# Patient Record
Sex: FEMALE | Race: WHITE | Employment: FULL TIME | URBAN - METROPOLITAN AREA
[De-identification: names, ages, dates, MRNs, and addresses within clinical notes are randomized per-mention and may not be internally consistent; named-entity substitution may affect disease eponyms.]

---

## 2021-11-03 ENCOUNTER — HOSPITAL ENCOUNTER (EMERGENCY)
Age: 46
Discharge: LEFT BEFORE DISCHARGE | End: 2021-11-03

## 2021-11-03 VITALS — TEMPERATURE: 97.3 F

## 2022-07-26 SDOH — HEALTH STABILITY: PHYSICAL HEALTH: ON AVERAGE, HOW MANY DAYS PER WEEK DO YOU ENGAGE IN MODERATE TO STRENUOUS EXERCISE (LIKE A BRISK WALK)?: 3 DAYS

## 2022-07-26 SDOH — HEALTH STABILITY: PHYSICAL HEALTH: ON AVERAGE, HOW MANY MINUTES DO YOU ENGAGE IN EXERCISE AT THIS LEVEL?: 100 MIN

## 2022-07-26 ASSESSMENT — SOCIAL DETERMINANTS OF HEALTH (SDOH)
WITHIN THE LAST YEAR, HAVE YOU BEEN KICKED, HIT, SLAPPED, OR OTHERWISE PHYSICALLY HURT BY YOUR PARTNER OR EX-PARTNER?: NO
WITHIN THE LAST YEAR, HAVE YOU BEEN AFRAID OF YOUR PARTNER OR EX-PARTNER?: NO
WITHIN THE LAST YEAR, HAVE YOU BEEN HUMILIATED OR EMOTIONALLY ABUSED IN OTHER WAYS BY YOUR PARTNER OR EX-PARTNER?: NO
WITHIN THE LAST YEAR, HAVE TO BEEN RAPED OR FORCED TO HAVE ANY KIND OF SEXUAL ACTIVITY BY YOUR PARTNER OR EX-PARTNER?: NO

## 2022-07-28 ENCOUNTER — HOSPITAL ENCOUNTER (OUTPATIENT)
Age: 47
Discharge: HOME | End: 2022-07-30
Payer: COMMERCIAL

## 2022-07-28 ENCOUNTER — HOSPITAL ENCOUNTER (OUTPATIENT)
Dept: GENERAL RADIOLOGY | Age: 47
Discharge: HOME | End: 2022-07-30
Payer: COMMERCIAL

## 2022-07-28 ENCOUNTER — OFFICE VISIT (OUTPATIENT)
Dept: FAMILY MEDICINE CLINIC | Age: 47
End: 2022-07-28
Payer: COMMERCIAL

## 2022-07-28 VITALS
RESPIRATION RATE: 16 BRPM | HEART RATE: 94 BPM | OXYGEN SATURATION: 97 % | SYSTOLIC BLOOD PRESSURE: 136 MMHG | BODY MASS INDEX: 41.3 KG/M2 | WEIGHT: 257 LBS | DIASTOLIC BLOOD PRESSURE: 84 MMHG | HEIGHT: 66 IN | TEMPERATURE: 98.2 F

## 2022-07-28 DIAGNOSIS — Z13.1 DIABETES MELLITUS SCREENING: ICD-10-CM

## 2022-07-28 DIAGNOSIS — E66.01 MORBID OBESITY (HCC): ICD-10-CM

## 2022-07-28 DIAGNOSIS — Z76.89 ENCOUNTER TO ESTABLISH CARE WITH NEW DOCTOR: Primary | ICD-10-CM

## 2022-07-28 DIAGNOSIS — G89.29 CHRONIC RIGHT HIP PAIN: ICD-10-CM

## 2022-07-28 DIAGNOSIS — M25.551 CHRONIC RIGHT HIP PAIN: ICD-10-CM

## 2022-07-28 DIAGNOSIS — G47.33 OSA (OBSTRUCTIVE SLEEP APNEA): ICD-10-CM

## 2022-07-28 DIAGNOSIS — Z86.16 HISTORY OF COVID-19: ICD-10-CM

## 2022-07-28 DIAGNOSIS — N93.9 ABNORMAL UTERINE BLEEDING (AUB): ICD-10-CM

## 2022-07-28 LAB
ALBUMIN SERPL-MCNC: 4.4 G/DL (ref 3.5–5.2)
ALP BLD-CCNC: 95 U/L (ref 35–104)
ALT SERPL-CCNC: 18 U/L (ref 0–32)
ANION GAP SERPL CALCULATED.3IONS-SCNC: 13 MMOL/L (ref 7–16)
AST SERPL-CCNC: 21 U/L (ref 0–31)
BILIRUB SERPL-MCNC: 0.4 MG/DL (ref 0–1.2)
BUN BLDV-MCNC: 14 MG/DL (ref 6–20)
CALCIUM SERPL-MCNC: 9.1 MG/DL (ref 8.6–10.2)
CHLORIDE BLD-SCNC: 103 MMOL/L (ref 98–107)
CHOLESTEROL, TOTAL: 207 MG/DL (ref 0–199)
CO2: 24 MMOL/L (ref 22–29)
CREAT SERPL-MCNC: 0.9 MG/DL (ref 0.5–1)
GFR AFRICAN AMERICAN: >60
GFR NON-AFRICAN AMERICAN: >60 ML/MIN/1.73
GLUCOSE BLD-MCNC: 79 MG/DL (ref 74–99)
HBA1C MFR BLD: 5.1 % (ref 4–5.6)
HCT VFR BLD CALC: 46.1 % (ref 34–48)
HDLC SERPL-MCNC: 48 MG/DL
HEMOGLOBIN: 15.1 G/DL (ref 11.5–15.5)
LDL CHOLESTEROL CALCULATED: 146 MG/DL (ref 0–99)
MCH RBC QN AUTO: 31.7 PG (ref 26–35)
MCHC RBC AUTO-ENTMCNC: 32.8 % (ref 32–34.5)
MCV RBC AUTO: 96.6 FL (ref 80–99.9)
PDW BLD-RTO: 12.9 FL (ref 11.5–15)
PLATELET # BLD: 233 E9/L (ref 130–450)
PMV BLD AUTO: 10.7 FL (ref 7–12)
POTASSIUM SERPL-SCNC: 4.6 MMOL/L (ref 3.5–5)
RBC # BLD: 4.77 E12/L (ref 3.5–5.5)
SODIUM BLD-SCNC: 140 MMOL/L (ref 132–146)
TOTAL PROTEIN: 7.6 G/DL (ref 6.4–8.3)
TRIGL SERPL-MCNC: 65 MG/DL (ref 0–149)
TSH SERPL DL<=0.05 MIU/L-ACNC: 1.44 UIU/ML (ref 0.27–4.2)
VITAMIN D 25-HYDROXY: 37 NG/ML (ref 30–100)
VLDLC SERPL CALC-MCNC: 13 MG/DL
WBC # BLD: 7.2 E9/L (ref 4.5–11.5)

## 2022-07-28 PROCEDURE — 73502 X-RAY EXAM HIP UNI 2-3 VIEWS: CPT

## 2022-07-28 PROCEDURE — 36415 COLL VENOUS BLD VENIPUNCTURE: CPT | Performed by: FAMILY MEDICINE

## 2022-07-28 PROCEDURE — 99204 OFFICE O/P NEW MOD 45 MIN: CPT | Performed by: FAMILY MEDICINE

## 2022-07-28 SDOH — ECONOMIC STABILITY: FOOD INSECURITY: WITHIN THE PAST 12 MONTHS, THE FOOD YOU BOUGHT JUST DIDN'T LAST AND YOU DIDN'T HAVE MONEY TO GET MORE.: NEVER TRUE

## 2022-07-28 SDOH — ECONOMIC STABILITY: FOOD INSECURITY: WITHIN THE PAST 12 MONTHS, YOU WORRIED THAT YOUR FOOD WOULD RUN OUT BEFORE YOU GOT MONEY TO BUY MORE.: NEVER TRUE

## 2022-07-28 ASSESSMENT — PATIENT HEALTH QUESTIONNAIRE - PHQ9
SUM OF ALL RESPONSES TO PHQ QUESTIONS 1-9: 0
1. LITTLE INTEREST OR PLEASURE IN DOING THINGS: 0
SUM OF ALL RESPONSES TO PHQ QUESTIONS 1-9: 0
SUM OF ALL RESPONSES TO PHQ9 QUESTIONS 1 & 2: 0
SUM OF ALL RESPONSES TO PHQ QUESTIONS 1-9: 0
SUM OF ALL RESPONSES TO PHQ QUESTIONS 1-9: 0
2. FEELING DOWN, DEPRESSED OR HOPELESS: 0

## 2022-07-28 ASSESSMENT — SOCIAL DETERMINANTS OF HEALTH (SDOH): HOW HARD IS IT FOR YOU TO PAY FOR THE VERY BASICS LIKE FOOD, HOUSING, MEDICAL CARE, AND HEATING?: NOT HARD AT ALL

## 2022-07-28 NOTE — PROGRESS NOTES
[facility])  Family Medicine Outpatient        SUBJECTIVE:  CC: had concerns including New Patient (Pt here to become establishe. [place] doctor [place]. ). Starla Crouch presented to the clinic for a new patient visit. She is a . She reports a history of chronic right hip pain. In addition, she has aub. She previously had an ablation and has not had a period in six years. Her period recently recurred. Review of Systems   Constitutional:  Negative for appetite change, fatigue and fever. Respiratory:  Negative for cough, shortness of breath and wheezing. Cardiovascular:  Negative for chest pain and palpitations. Gastrointestinal:  Negative for abdominal pain, constipation, diarrhea, nausea and vomiting. Genitourinary:  Positive for menstrual problem. Negative for frequency. Musculoskeletal:  Positive for arthralgias. Negative for gait problem. No outpatient medications have been marked as taking for the 7/28/22 encounter (Office Visit) with Rich Heart MD.       No past medical history on file. No past surgical history on file. No family history on file. No Known Allergies    Social History:  TOBACCO:   reports that she has been smoking cigarettes. She started smoking about 28 years ago. She has a 28.00 pack-year smoking history. She has never used smokeless tobacco.  ETOH:   reports current alcohol use of about 1.0 - 2.0 standard drink per week. OBJECTIVE    VS: /84   Pulse 94   Temp 98.2 °F (36.8 °C)   Resp 16   Ht 5' 6\" (1.676 m)   Wt 257 lb (116.6 kg)   LMP 07/20/2022 (Exact Date)   SpO2 97%   BMI 41.48 kg/m²   Physical Exam  Constitutional:       General: She is not in acute distress. Appearance: She is well-developed. She is obese. She is not diaphoretic. HENT:      Head: Normocephalic and atraumatic.       Right Ear: External ear normal.      Left Ear: External ear normal.   Eyes:      Conjunctiva/sclera: Conjunctivae normal.      Pupils: Pupils are equal, round, and reactive to light. Cardiovascular:      Rate and Rhythm: Normal rate and regular rhythm. Heart sounds: Normal heart sounds. No murmur heard. No friction rub. No gallop. Pulmonary:      Effort: Pulmonary effort is normal.      Breath sounds: Normal breath sounds. Abdominal:      General: Bowel sounds are normal.      Palpations: Abdomen is soft. Tenderness: There is no abdominal tenderness. Hernia: No hernia is present. Musculoskeletal:         General: Normal range of motion. Cervical back: Normal range of motion and neck supple. Skin:     General: Skin is warm and dry. Neurological:      Mental Status: She is alert and oriented to person, place, and time. Psychiatric:         Behavior: Behavior normal.       ASSESSMENT/PLAN:  1. Encounter to establish care with new doctor    2. Morbid obesity ([address]  Baseline labs placed and done today. Recommend lifestyle modifications with well balanced diet and exercise 150 min/week as tolerated. - CBC; Future  - Comprehensive Metabolic Panel; Future  - Lipid Panel; Future  - TSH; Future  - Vitamin D 25 Hydroxy; Future    3. Chronic right hip pain  - XR HIP 2-3 VW W PELVIS RIGHT; Future    4. Abnormal uterine bleeding (AUB)  - [address], Shane Hines MD, OB/GYN, [facility]    5. ZENA (obstructive sleep apnea)  On cpap. Patient reports compliance. 6. Diabetes mellitus screening  - Hemoglobin A1C; Future    7. History of Covid    I have reviewed my findings and recommendations with Marleen Pedroza MD  8/19/2022 8:09 AM    Counseled regarding above diagnosis, including possible risks and complications, especially if left uncontrolled. Discussed medications risk/benefits and possible side effects and alternatives to treatment.  Patient and/or guardian verbalizes understanding, agrees, feels comfortable with and wishes to proceed with above treatment plan. Advised patient regarding importance of keeping up with recommended health maintenance and to schedule as soon as possible if overdue, as this is important in assessing for undiagnosed pathology, especially cancer, as well as protecting against potentially harmful/life threatening disease. Patient and/or guardian verbalizes understanding and agrees with above counseling, assessment and plan. All questions answered. Please note this report has been partially produced using speech recognition software  and may contain errors related to that system including grammar, punctuation and spelling as well as words and phrases that may seem inappropriate. If there are questions or concerns please feel free to contact me to clarify.

## 2022-07-29 ENCOUNTER — TELEPHONE (OUTPATIENT)
Dept: FAMILY MEDICINE CLINIC | Age: 47
End: 2022-07-29

## 2022-07-29 NOTE — TELEPHONE ENCOUNTER
----- Message from Winnie Ruff sent at 7/29/2022  1:53 PM EDT -----  Subject: Message to Provider    QUESTIONS  Information for Provider? Pt was returning call to the practice in regards   to lab results. I was unable to get through to the office. Please contact   pt.  ---------------------------------------------------------------------------  --------------  Breezy Counter INFO  [phone];  Do not leave any message, patient will call back for answer  ---------------------------------------------------------------------------  --------------  SCRIPT ANSWERS  undefined

## 2022-07-29 NOTE — TELEPHONE ENCOUNTER
Attempted to contact pt back, no answer. Left detailed vm.      Electronically signed by Margaret Cisneros on 7/29/22 at 2:06 PM EDT

## 2022-08-19 PROBLEM — E66.01 MORBID OBESITY (HCC): Status: ACTIVE | Noted: 2022-08-19

## 2022-08-19 PROBLEM — G89.29 CHRONIC RIGHT HIP PAIN: Status: ACTIVE | Noted: 2022-08-19

## 2022-08-19 PROBLEM — G47.33 OSA (OBSTRUCTIVE SLEEP APNEA): Status: ACTIVE | Noted: 2022-08-19

## 2022-08-19 PROBLEM — M25.551 CHRONIC RIGHT HIP PAIN: Status: ACTIVE | Noted: 2022-08-19

## 2022-08-19 PROBLEM — N93.9 ABNORMAL UTERINE BLEEDING (AUB): Status: ACTIVE | Noted: 2022-08-19

## 2022-08-19 ASSESSMENT — ENCOUNTER SYMPTOMS
NAUSEA: 0
SHORTNESS OF BREATH: 0
VOMITING: 0
DIARRHEA: 0
CONSTIPATION: 0
ABDOMINAL PAIN: 0
WHEEZING: 0
COUGH: 0

## 2022-08-28 ENCOUNTER — APPOINTMENT (OUTPATIENT)
Dept: GENERAL RADIOLOGY | Age: 47
End: 2022-08-28
Payer: COMMERCIAL

## 2022-08-28 ENCOUNTER — APPOINTMENT (OUTPATIENT)
Dept: CT IMAGING | Age: 47
End: 2022-08-28
Payer: COMMERCIAL

## 2022-08-28 ENCOUNTER — HOSPITAL ENCOUNTER (EMERGENCY)
Age: 47
Discharge: HOME | End: 2022-08-28
Attending: EMERGENCY MEDICINE
Payer: COMMERCIAL

## 2022-08-28 VITALS
RESPIRATION RATE: 20 BRPM | WEIGHT: 257 LBS | HEART RATE: 72 BPM | OXYGEN SATURATION: 96 % | BODY MASS INDEX: 41.48 KG/M2 | SYSTOLIC BLOOD PRESSURE: 128 MMHG | TEMPERATURE: 98.2 F | DIASTOLIC BLOOD PRESSURE: 99 MMHG

## 2022-08-28 DIAGNOSIS — M47.9 OSTEOARTHRITIS OF SPINE, UNSPECIFIED SPINAL OSTEOARTHRITIS COMPLICATION STATUS, UNSPECIFIED SPINAL REGION: ICD-10-CM
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
## 2022-08-28 DIAGNOSIS — S16.1XXA STRAIN OF NECK MUSCLE, INITIAL ENCOUNTER: ICD-10-CM

## 2022-08-28 PROCEDURE — 6360000002 HC RX W HCPCS: Performed by: EMERGENCY MEDICINE

## 2022-08-28 PROCEDURE — 72125 CT NECK SPINE W/O DYE: CPT

## 2022-08-28 PROCEDURE — 71046 X-RAY EXAM CHEST 2 VIEWS: CPT

## 2022-08-28 PROCEDURE — 72131 CT LUMBAR SPINE W/O DYE: CPT

## 2022-08-28 PROCEDURE — 99284 EMERGENCY DEPT VISIT MOD MDM: CPT

## 2022-08-28 PROCEDURE — 70450 CT HEAD/BRAIN W/O DYE: CPT

## 2022-08-28 PROCEDURE — 96372 THER/PROPH/DIAG INJ SC/IM: CPT

## 2022-08-28 PROCEDURE — 72128 CT CHEST SPINE W/O DYE: CPT

## 2022-08-28 RX ORDER — IBUPROFEN 800 MG/1
800 TABLET ORAL EVERY 8 HOURS PRN
Qty: 21 TABLET | Refills: 0 | Status: SHIPPED | OUTPATIENT
Start: 2022-08-28 | End: 2022-09-08 | Stop reason: SDUPTHER

## 2022-08-28 RX ORDER — ORPHENADRINE CITRATE 100 MG/1
100 TABLET, EXTENDED RELEASE ORAL 2 TIMES DAILY
Qty: 20 TABLET | Refills: 0 | Status: SHIPPED | OUTPATIENT
Start: 2022-08-28 | End: 2022-09-07

## 2022-08-28 RX ORDER — ORPHENADRINE CITRATE 30 MG/ML
60 INJECTION INTRAMUSCULAR; INTRAVENOUS ONCE
Status: COMPLETED | OUTPATIENT
Start: 2022-08-28 | End: 2022-08-28

## 2022-08-28 RX ORDER — KETOROLAC TROMETHAMINE 30 MG/ML
30 INJECTION, SOLUTION INTRAMUSCULAR; INTRAVENOUS ONCE
Status: COMPLETED | OUTPATIENT
Start: 2022-08-28 | End: 2022-08-28

## 2022-08-28 RX ADMIN — KETOROLAC TROMETHAMINE 30 MG: 30 INJECTION, SOLUTION INTRAMUSCULAR; INTRAVENOUS at 15:31

## 2022-08-28 RX ADMIN — ORPHENADRINE CITRATE 60 MG: 30 INJECTION INTRAMUSCULAR; INTRAVENOUS at 15:30

## 2022-08-28 ASSESSMENT — LIFESTYLE VARIABLES: HOW OFTEN DO YOU HAVE A DRINK CONTAINING ALCOHOL: MONTHLY OR LESS

## 2022-08-28 ASSESSMENT — PAIN SCALES - GENERAL
PAINLEVEL_OUTOF10: 5

## 2022-08-28 ASSESSMENT — PAIN - FUNCTIONAL ASSESSMENT: PAIN_FUNCTIONAL_ASSESSMENT: 0-10

## 2022-08-28 NOTE — ED NOTES
Pt at [address] [employer] and [address], RN  08/28/22 1500

## 2022-08-28 NOTE — PROGRESS NOTES
Name: Vernon Bahena  : 1975  MRN: [id number]    Date: 2022    Benefits of immediately proceeding with Radiology exam outweigh the risks and therefore the following is being waived:      [x] Pregnancy  - s/p ablation     [] Protocol for Iodine allergy    [] MRI questionnaire    [] BUN/Creatinine        Kellen Steiner DO

## 2022-08-28 NOTE — Clinical Note
Jeremy Yi was seen and treated in our emergency department on 8/28/2022. She may return to work on 08/30/2022. If you have any questions or concerns, please don't hesitate to call.       Gia Atkinson, DO

## 2022-08-28 NOTE — Clinical Note
Nickolas Ocampo was seen and treated in our emergency department on 8/28/2022. She may return to work on 08/30/2022. If you have any questions or concerns, please don't hesitate to call.       Chriss Stephenson, DO
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
## 2022-09-08 ENCOUNTER — OFFICE VISIT (OUTPATIENT)
Dept: FAMILY MEDICINE CLINIC | Age: 47
End: 2022-09-08
Payer: COMMERCIAL

## 2022-09-08 VITALS
BODY MASS INDEX: 41.14 KG/M2 | SYSTOLIC BLOOD PRESSURE: 130 MMHG | DIASTOLIC BLOOD PRESSURE: 76 MMHG | TEMPERATURE: 98.4 F | RESPIRATION RATE: 16 BRPM | HEART RATE: 89 BPM | HEIGHT: 66 IN | OXYGEN SATURATION: 96 % | WEIGHT: 256 LBS

## 2022-09-08 DIAGNOSIS — G89.29 CHRONIC RIGHT HIP PAIN: ICD-10-CM

## 2022-09-08 DIAGNOSIS — E78.2 MIXED HYPERLIPIDEMIA: ICD-10-CM

## 2022-09-08 DIAGNOSIS — L30.9 DERMATITIS: ICD-10-CM

## 2022-09-08 DIAGNOSIS — M54.12 CERVICAL RADICULOPATHY: ICD-10-CM

## 2022-09-08 DIAGNOSIS — M51.34 DDD (DEGENERATIVE DISC DISEASE), THORACIC: ICD-10-CM

## 2022-09-08 DIAGNOSIS — M51.36 DDD (DEGENERATIVE DISC DISEASE), LUMBAR: ICD-10-CM
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
## 2022-09-08 DIAGNOSIS — M50.30 DDD (DEGENERATIVE DISC DISEASE), CERVICAL: ICD-10-CM

## 2022-09-08 DIAGNOSIS — M25.551 CHRONIC RIGHT HIP PAIN: ICD-10-CM
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
## 2022-09-08 PROCEDURE — 99214 OFFICE O/P EST MOD 30 MIN: CPT | Performed by: FAMILY MEDICINE

## 2022-09-08 RX ORDER — ORPHENADRINE CITRATE 100 MG/1
100 TABLET, EXTENDED RELEASE ORAL 2 TIMES DAILY
COMMUNITY
End: 2022-09-08 | Stop reason: SDUPTHER

## 2022-09-08 RX ORDER — IBUPROFEN 800 MG/1
800 TABLET ORAL EVERY 12 HOURS
Qty: 30 TABLET | Refills: 0 | Status: SHIPPED
Start: 2022-09-08 | End: 2022-10-17

## 2022-09-08 RX ORDER — ORPHENADRINE CITRATE 100 MG/1
100 TABLET, EXTENDED RELEASE ORAL 2 TIMES DAILY PRN
Qty: 30 TABLET | Refills: 0 | Status: SHIPPED
Start: 2022-09-08 | End: 2022-09-28

## 2022-09-08 RX ORDER — METHYLPREDNISOLONE 4 MG/1
TABLET ORAL
Qty: 1 KIT | Refills: 0 | Status: SHIPPED | OUTPATIENT
Start: 2022-09-08 | End: 2022-09-14

## 2022-09-08 RX ORDER — TRIAMCINOLONE ACETONIDE 0.25 MG/G
OINTMENT TOPICAL 2 TIMES DAILY
Qty: 1 EACH | Refills: 0 | Status: SHIPPED | OUTPATIENT
Start: 2022-09-08 | End: 2022-09-15

## 2022-09-08 ASSESSMENT — ENCOUNTER SYMPTOMS
SHORTNESS OF BREATH: 0
BACK PAIN: 1
NAUSEA: 0
ABDOMINAL PAIN: 0
VOMITING: 0
WHEEZING: 0
CONSTIPATION: 0
DIARRHEA: 0
COUGH: 0
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
## 2022-09-22 ENCOUNTER — OFFICE VISIT (OUTPATIENT)
Dept: FAMILY MEDICINE CLINIC | Age: 47
End: 2022-09-22
Payer: COMMERCIAL

## 2022-09-22 VITALS
BODY MASS INDEX: 40.98 KG/M2 | WEIGHT: 255 LBS | SYSTOLIC BLOOD PRESSURE: 130 MMHG | RESPIRATION RATE: 16 BRPM | OXYGEN SATURATION: 98 % | TEMPERATURE: 98.9 F | DIASTOLIC BLOOD PRESSURE: 70 MMHG | HEART RATE: 80 BPM | HEIGHT: 66 IN

## 2022-09-22 DIAGNOSIS — J30.2 SEASONAL ALLERGIES: Primary | ICD-10-CM

## 2022-09-22 DIAGNOSIS — R23.2 HOT FLASHES: ICD-10-CM

## 2022-09-22 DIAGNOSIS — R21 RASH: ICD-10-CM

## 2022-09-22 PROCEDURE — 99214 OFFICE O/P EST MOD 30 MIN: CPT | Performed by: FAMILY MEDICINE

## 2022-09-22 RX ORDER — PAROXETINE 10 MG/1
10 TABLET, FILM COATED ORAL DAILY
Qty: 30 TABLET | Refills: 1 | Status: SHIPPED
Start: 2022-09-22 | End: 2022-10-17

## 2022-09-22 RX ORDER — MUPIROCIN CALCIUM 20 MG/G
CREAM TOPICAL
Qty: 30 G | Refills: 0 | Status: SHIPPED | OUTPATIENT
Start: 2022-09-22 | End: 2022-10-22

## 2022-09-22 RX ORDER — LORATADINE 10 MG/1
10 TABLET ORAL DAILY
Qty: 30 TABLET | Refills: 1 | Status: SHIPPED
Start: 2022-09-22 | End: 2022-10-17

## 2022-09-22 RX ORDER — FLUTICASONE PROPIONATE 50 MCG
2 SPRAY, SUSPENSION (ML) NASAL DAILY
Qty: 48 G | Refills: 1 | Status: SHIPPED | OUTPATIENT
Start: 2022-09-22

## 2022-09-22 NOTE — LETTER
[facility]  [address] [place] [id number]  Phone: [phone]  Fax: Shayy Jain MD        September 22, 2022     Patient: Jeremy Yi   YOB: 1975   Date of Visit: 9/22/2022       To Whom It May Concern:    Jeremy Yi was seen in the clinic today. She is anticipated to rtw 9/24/22. If you have any questions or concerns, please don't hesitate to call.     Sincerely,        Casey Ferreira MD

## 2022-09-22 NOTE — PROGRESS NOTES
[facility])  Family Medicine Outpatient        SUBJECTIVE:  CC: had concerns including Congestion (Home covid negative). HPI:  Rehana Phillips is a female 42 y.o. presented to the clinic for congestion. She denies being on anything at this time. She took a home covid test that was negative. In addition, she feels like she has been getting hot flashes start this past year. Her periods are spreading out. She has had 3 in the past year. Review of Systems   Constitutional:  Negative for appetite change, fatigue and fever. HENT:  Positive for congestion. Respiratory:  Negative for cough, shortness of breath and wheezing. Cardiovascular:  Negative for chest pain and palpitations. Gastrointestinal:  Negative for abdominal pain, constipation, diarrhea, nausea and vomiting. Endocrine: Positive for heat intolerance. Negative for polydipsia. Outpatient Medications Marked as Taking for the 9/22/22 encounter (Office Visit) with Lucia Edmondson MD   Medication Sig Dispense Refill    mupirocin (BACTROBAN) 2 % cream Apply 3 times daily x 7 days 30 g 0    loratadine (CLARITIN) 10 MG tablet Take 1 tablet by mouth daily 30 tablet 1    fluticasone (FLONASE) 50 MCG/ACT nasal spray 2 sprays by Each Nostril route daily 48 g 1    PARoxetine (PAXIL) 10 MG tablet Take 1 tablet by mouth daily 30 tablet 1    ibuprofen (IBU) 800 MG tablet Take 1 tablet by mouth in the morning and 1 tablet in the evening. Do all this for 15 days. 30 tablet 0    [DISCONTINUED] orphenadrine (NORFLEX) 100 MG extended release tablet Take 1 tablet by mouth 2 times daily as needed for Muscle spasms 30 tablet 0       I have reviewed all pertinent PMHx, PSHx, FamHx, SocialHx, medications, and allergies and updated history as appropriate.     OBJECTIVE    VS: /70   Pulse 80   Temp 98.9 °F (37.2 °C)   Resp 16   Ht 5' 6\" (1.676 m)   Wt 255 lb (115.7 kg)   SpO2 98%   BMI 41.16 kg/m²   Physical Exam  Constitutional:       General: She is not in acute distress. Appearance: She is well-developed. She is obese. She is not diaphoretic. HENT:      Head: Normocephalic and atraumatic. Mouth/Throat:      Mouth: Mucous membranes are moist.      Pharynx: No oropharyngeal exudate or posterior oropharyngeal erythema. Eyes:      Conjunctiva/sclera: Conjunctivae normal.      Pupils: Pupils are equal, round, and reactive to light. Cardiovascular:      Rate and Rhythm: Normal rate and regular rhythm. Pulmonary:      Effort: Pulmonary effort is normal.      Breath sounds: Normal breath sounds. Abdominal:      General: Bowel sounds are normal. There is no distension. Palpations: Abdomen is soft. Tenderness: There is no abdominal tenderness. Hernia: No hernia is present. Musculoskeletal:      Cervical back: Normal range of motion and neck supple. Skin:     General: Skin is warm and dry. Neurological:      Mental Status: She is alert and oriented to person, place, and time. ASSESSMENT/PLAN:  1. Seasonal allergies  - loratadine (CLARITIN) 10 MG tablet; Take 1 tablet by mouth daily  Dispense: 30 tablet; Refill: 1  - fluticasone (FLONASE) 50 MCG/ACT nasal spray; 2 sprays by Each Nostril route daily  Dispense: 48 g; Refill: 1    2. Hot flashes  - PARoxetine (PAXIL) 10 MG tablet; Take 1 tablet by mouth daily  Dispense: 30 tablet; Refill: 1    3. Rash  - mupirocin (BACTROBAN) 2 % cream; Apply 3 times daily x 7 days  Dispense: 30 g; Refill: 0    I have reviewed my findings and recommendations with Leticia Bello MD  9/29/2022 9:11 PM  Return for established f/u. Counseled regarding above diagnosis, including possible risks and complications, especially if left uncontrolled. Patient counseled on red flag symptoms and if they occur to go to the ED. Discussed medications risk/benefits and possible side effects and alternatives to treatment.  Patient and/or guardian verbalizes understanding, agrees,
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
## 2022-09-26 RX ORDER — IBUPROFEN 800 MG/1
800 TABLET ORAL EVERY 12 HOURS
Qty: 30 TABLET | Refills: 0 | OUTPATIENT
Start: 2022-09-26 | End: 2022-10-11

## 2022-09-28 ENCOUNTER — OFFICE VISIT (OUTPATIENT)
Dept: OBGYN | Age: 47
End: 2022-09-28
Payer: COMMERCIAL

## 2022-09-28 ENCOUNTER — HOSPITAL ENCOUNTER (OUTPATIENT)
Age: 47
Discharge: HOME | End: 2022-09-28
Payer: COMMERCIAL

## 2022-09-28 VITALS
DIASTOLIC BLOOD PRESSURE: 66 MMHG | HEIGHT: 65 IN | BODY MASS INDEX: 41.65 KG/M2 | HEART RATE: 85 BPM | WEIGHT: 250 LBS | SYSTOLIC BLOOD PRESSURE: 139 MMHG

## 2022-09-28 DIAGNOSIS — N93.9 ABNORMAL UTERINE BLEEDING: ICD-10-CM

## 2022-09-28 DIAGNOSIS — Z12.4 SCREENING FOR CERVICAL CANCER: Primary | ICD-10-CM

## 2022-09-28 DIAGNOSIS — Z01.419 ENCOUNTER FOR WELL WOMAN EXAM: ICD-10-CM

## 2022-09-28 LAB
GONADOTROPIN, CHORIONIC (HCG) QUANT: 0.3 MIU/ML
PROLACTIN: 7.45 NG/ML

## 2022-09-28 PROCEDURE — 84146 ASSAY OF PROLACTIN: CPT

## 2022-09-28 PROCEDURE — 84702 CHORIONIC GONADOTROPIN TEST: CPT

## 2022-09-28 PROCEDURE — 36415 COLL VENOUS BLD VENIPUNCTURE: CPT

## 2022-09-28 PROCEDURE — 99386 PREV VISIT NEW AGE 40-64: CPT | Performed by: OBSTETRICS & GYNECOLOGY

## 2022-09-28 PROCEDURE — 99203 OFFICE O/P NEW LOW 30 MIN: CPT | Performed by: OBSTETRICS & GYNECOLOGY

## 2022-09-28 RX ORDER — ORPHENADRINE CITRATE 100 MG/1
100 TABLET, EXTENDED RELEASE ORAL 2 TIMES DAILY PRN
Qty: 30 TABLET | Refills: 0 | Status: SHIPPED
Start: 2022-09-28 | End: 2022-10-28

## 2022-09-28 NOTE — PROGRESS NOTES
New patient alert and pleasant with concerns about premenopausal symptoms  Here today for annual GYN exam  Pelvic exam, pap smear obtained, labeled  and delivered to lab. Discharge instructions have been discussed with the patient. Patient advised to call our office with any questions or concerns. Voiced understanding.

## 2022-09-28 NOTE — PATIENT INSTRUCTIONS
Please call the number below to schedule your imaging we've requested:  [phone], select option #1

## 2022-09-28 NOTE — PROGRESS NOTES
[address] [facility]     Patient presents for annual exam.     Patient states she had an uterine ablation with Mirena placement at that time. She then had the Mirena removed two years ago and was without cycles for 1 year after that. Since then she has started to have cycles. They have become heavier and prolonged. Patient had a TSH in July that was normal.     Discussed treatment options. Patient would like the Mirena. No past medical history on file. Past Surgical History:   Procedure Laterality Date    CHOLECYSTECTOMY  2007        No family history on file. Current Outpatient Medications:     loratadine (CLARITIN) 10 MG tablet, Take 1 tablet by mouth daily, Disp: 30 tablet, Rfl: 1    fluticasone (FLONASE) 50 MCG/ACT nasal spray, 2 sprays by Each Nostril route daily, Disp: 48 g, Rfl: 1    PARoxetine (PAXIL) 10 MG tablet, Take 1 tablet by mouth daily, Disp: 30 tablet, Rfl: 1    mupirocin (BACTROBAN) 2 % cream, Apply 3 times daily x 7 days, Disp: 30 g, Rfl: 0    ibuprofen (IBU) 800 MG tablet, Take 1 tablet by mouth in the morning and 1 tablet in the evening. Do all this for 15 days. , Disp: 30 tablet, Rfl: 0     No Known Allergies     Social History       Tobacco History       Smoking Status  Every Day Smoking Start Date  1994 Smoking Frequency  1 pack/day for 28.00 years (28.00 pk-yrs) Smoking Tobacco Type  Cigarettes since 1994      Smokeless Tobacco Use  Never              Alcohol History       Alcohol Use Status  Yes Drinks/Week  1-2 Glasses of wine per week Amount  1.0 - 2.0 standard drink of alcohol/wk Comment  weekly              Drug Use       Drug Use Status  Never              Sexual Activity       Sexually Active  Not Asked                     Vitals:    09/28/22 1013   BP: 139/66   Pulse: 85        Physical Exam:  General: pleasant, alert    Breasts: breasts appear normal, no suspicious masses, no skin or nipple changes or axillary nodes.      Pelvic exam: normal external genitalia, vulva, vagina, cervix, uterus and adnexa. No uterine or adnexal masses or tenderness. Iram Coyne was seen today for new patient and other. Diagnoses and all orders for this visit:    Screening for cervical cancer  -     PAP SMEAR    Encounter for well woman exam    Abnormal uterine bleeding  -     US PELVIS COMPLETE; Future  -     Prolactin; Future  -     HCG, Quantitative, Pregnancy; Future    Mirena precert completed. Return for Mirena placement .      Caitlin Shay MD

## 2022-09-29 ENCOUNTER — EVALUATION (OUTPATIENT)
Dept: PHYSICAL THERAPY | Age: 47
End: 2022-09-29
Payer: COMMERCIAL

## 2022-09-29 DIAGNOSIS — M25.551 CHRONIC RIGHT HIP PAIN: ICD-10-CM

## 2022-09-29 DIAGNOSIS — M50.30 DDD (DEGENERATIVE DISC DISEASE), CERVICAL: Primary | ICD-10-CM

## 2022-09-29 DIAGNOSIS — M51.36 DDD (DEGENERATIVE DISC DISEASE), LUMBAR: ICD-10-CM

## 2022-09-29 DIAGNOSIS — G89.29 CHRONIC RIGHT HIP PAIN: ICD-10-CM

## 2022-09-29 DIAGNOSIS — M51.34 DDD (DEGENERATIVE DISC DISEASE), THORACIC: ICD-10-CM

## 2022-09-29 DIAGNOSIS — M54.12 CERVICAL RADICULOPATHY: ICD-10-CM

## 2022-09-29 PROCEDURE — 97162 PT EVAL MOD COMPLEX 30 MIN: CPT | Performed by: PHYSICAL THERAPIST

## 2022-09-29 PROCEDURE — 97110 THERAPEUTIC EXERCISES: CPT | Performed by: PHYSICAL THERAPIST

## 2022-09-29 ASSESSMENT — ENCOUNTER SYMPTOMS
SHORTNESS OF BREATH: 0
DIARRHEA: 0
WHEEZING: 0
VOMITING: 0
ABDOMINAL PAIN: 0
COUGH: 0
NAUSEA: 0
CONSTIPATION: 0
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
## 2022-09-29 NOTE — PROGRESS NOTES
[facility]          Phone: [phone] Fax: [phone]    Physical Therapy Daily Treatment Note  Date:  2022    Patient Name:  Erasmo Esquivel    :  1975  MRN: [id number]    Evaluating therapist: Alex Baez PT, DPT  [id number]    Restrictions/Precautions:      Diagnosis:     Diagnosis Orders   1. DDD (degenerative disc disease), cervical        2. Chronic right hip pain        3. DDD (degenerative disc disease), thoracic        4. Cervical radiculopathy        5. DDD (degenerative disc disease), lumbar          Treatment Diagnosis:    Insurance/Certification information:  [employer]  Referring Physician:  Cami Ren MD  Plan of care signed (Y/N):    Visit# / total visits:    Pain level: 5/10 R hip, 4/10 cervical spine  Time In:  810  Time Out:  855    Subjective:  See initial eval    Exercises:  Exercise/Equipment Resistance/Repetitions Other comments            Hamstring stretch 30 sec x2 ea LE PFB     IT band stretch 30 sec ea LE PFB     Seated piriformis stretch 30 sec ea LE      Cervical flexion/extension stretches 5x 10 sec hold ea      Cervical rotation 20 sec x3 reps ea side      Lateral flexion c-s[ine 20 sec x3 reps ea side      Levator scapulae stretch 20 sec x3 reps ea side                                                                                      Other Therapeutic Activities:  Pt tolerated introduction of stretches for improved ROM with mild discomfort. Demonstrated understanding and appropriate technique to initiate HEP.      Home Exercise Program:  hamstring and ITB stretch, seated piriformis stretch, cervical ROM stretches listed above    Manual Treatments:  N/A    Modalities:  N/A     Time-in Time-out Total Time   45543  Evaluation Low Complexity      00319  Evaluation Med Complexity 0810 0835 25   69758  Evaluation High Complexity      61202  Ther Ex 0838 0855 20   74597  Neuro Re-ed        [address] [place] [address]  Ther Activities        80851 Manual Therapy       50360  E-stim       45555  Ultrasound            Session 8664 3552 44       Treatment/Activity Tolerance:  [x] Patient tolerated treatment well [] Patient limited by fatigue  [] Patient limited by pain  [] Patient limited by other medical complications  [] Other:     Prognosis: [x] Good [] Fair  [] Poor    Patient Requires Follow-up: [x] Yes  [] No    Plan:   [x] Continue per plan of care [] Alter current plan (see comments)  [] Plan of care initiated [] Hold pending MD visit [] Discharge    Plan for Next Session:  manual therapy to c-spine PRN, R LE strengthening and ROM exercises.       Electronically signed by:  Carmelo Schwartz, PT, DPT  [id number]

## 2022-10-04 ENCOUNTER — OFFICE VISIT (OUTPATIENT)
Dept: PHYSICAL MEDICINE AND REHAB | Age: 47
End: 2022-10-04
Payer: COMMERCIAL

## 2022-10-04 VITALS
DIASTOLIC BLOOD PRESSURE: 81 MMHG | HEART RATE: 71 BPM | WEIGHT: 254 LBS | HEIGHT: 66 IN | SYSTOLIC BLOOD PRESSURE: 118 MMHG | BODY MASS INDEX: 40.82 KG/M2

## 2022-10-04 DIAGNOSIS — M79.18 MYOFASCIAL PAIN: ICD-10-CM

## 2022-10-04 DIAGNOSIS — M54.2 NECK PAIN: Primary | ICD-10-CM
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
## 2022-10-04 PROCEDURE — 99204 OFFICE O/P NEW MOD 45 MIN: CPT | Performed by: PHYSICAL MEDICINE & REHABILITATION
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
## 2022-10-06 ENCOUNTER — HOSPITAL ENCOUNTER (OUTPATIENT)
Dept: ULTRASOUND IMAGING | Age: 47
Discharge: HOME | End: 2022-10-06
Payer: COMMERCIAL

## 2022-10-06 DIAGNOSIS — N93.9 ABNORMAL UTERINE BLEEDING: ICD-10-CM

## 2022-10-06 PROCEDURE — 76856 US EXAM PELVIC COMPLETE: CPT

## 2022-10-06 PROCEDURE — 76830 TRANSVAGINAL US NON-OB: CPT

## 2022-10-12 ENCOUNTER — TREATMENT (OUTPATIENT)
Dept: PHYSICAL THERAPY | Age: 47
End: 2022-10-12

## 2022-10-14 DIAGNOSIS — J30.2 SEASONAL ALLERGIES: ICD-10-CM

## 2022-10-14 DIAGNOSIS — R23.2 HOT FLASHES: ICD-10-CM

## 2022-10-17 RX ORDER — PAROXETINE 10 MG/1
TABLET, FILM COATED ORAL
Qty: 30 TABLET | Refills: 1 | Status: SHIPPED | OUTPATIENT
Start: 2022-10-17

## 2022-10-17 RX ORDER — LORATADINE 10 MG/1
TABLET ORAL
Qty: 30 TABLET | Refills: 1 | Status: SHIPPED | OUTPATIENT
Start: 2022-10-17
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
## 2022-10-28 RX ORDER — ORPHENADRINE CITRATE 100 MG/1
100 TABLET, EXTENDED RELEASE ORAL 2 TIMES DAILY PRN
Qty: 30 TABLET | Refills: 0 | Status: SHIPPED | OUTPATIENT
Start: 2022-10-28 | End: 2022-11-12

## 2022-11-10 NOTE — PROGRESS NOTES
Date:  11/10/2022          Dear Jany Mart MD:       This is to inform you that, as per [address], your patient Ortega Phillip is as of todays date being discharged from Physical Therapy secondary to the following reasons:    If a Physical Therapy outpatient misses three scheduled appointments without any prior notification, he or she will be discharged from physical therapy services. A new prescription will be necessary to resume physical therapy. If a client is absent for 50% of scheduled visits during a one-month period, he or she will be discharged from physical therapy services. A new prescription will be necessary to resume physical therapy. Last appointment attended: Initial evaluation 9/29/22      If you have any questions, please feel free to call at [phone]      Thank you          Manju Solorzano PT, DPT  [id number]      [phone]    The information contained in this Fax the designated recipients intend message only for the personal and confidential use named above. This information is to be handled as privileged and confidential.  If the reader of this message is not the intended recipient, you are hereby notified that you have received this document in error and that any review, dissemination, distribution or copying of this message is strictly prohibited. If you receive this communication in error, please notify us immediately at the above number and return the original to us by mail.   Thank you      [facility]   [place], [address] [address]

## 2022-12-02 DIAGNOSIS — R23.2 HOT FLASHES: ICD-10-CM

## 2022-12-02 RX ORDER — PAROXETINE 10 MG/1
TABLET, FILM COATED ORAL
Qty: 30 TABLET | Refills: 1 | Status: SHIPPED | OUTPATIENT
Start: 2022-12-02

## 2022-12-19 ENCOUNTER — PROCEDURE VISIT (OUTPATIENT)
Dept: OBGYN | Age: 47
End: 2022-12-19
Payer: COMMERCIAL

## 2022-12-19 VITALS
WEIGHT: 257 LBS | HEIGHT: 66 IN | SYSTOLIC BLOOD PRESSURE: 137 MMHG | BODY MASS INDEX: 41.3 KG/M2 | HEART RATE: 76 BPM | DIASTOLIC BLOOD PRESSURE: 64 MMHG

## 2022-12-19 DIAGNOSIS — N93.9 ABNORMAL UTERINE BLEEDING: Primary | ICD-10-CM

## 2022-12-19 LAB
CONTROL: NORMAL
PREGNANCY TEST URINE, POC: NEGATIVE

## 2022-12-19 PROCEDURE — 99214 OFFICE O/P EST MOD 30 MIN: CPT | Performed by: OBSTETRICS & GYNECOLOGY

## 2022-12-19 PROCEDURE — 58300 INSERT INTRAUTERINE DEVICE: CPT | Performed by: OBSTETRICS & GYNECOLOGY

## 2022-12-19 PROCEDURE — 81025 URINE PREGNANCY TEST: CPT | Performed by: OBSTETRICS & GYNECOLOGY

## 2022-12-19 NOTE — PROGRESS NOTES
Patient alert and pleasant with no new complaints  Here today for Mirena placement   Mirena ordered through [facility] specialty pharmacy and sent to this office from [facility] pharmacy. Consent form signed and scanned  Urine for pregnancy obtained with negative results  Assisted doctor with Mirena placement. Patient tolerated well  All Mirena information scanned into chart  Discharge instructions have been discussed with the patient. Patient advised to call our office with any questions or concerns. Voiced understanding.

## 2022-12-19 NOTE — PROGRESS NOTES
12/19/2022     Christina Stain       Patient presents for IUD insertion. Risks reviewed with patient: YES  Consent obtained: YES     IUD INSERTION   Procedure note:   Patient was placed in dorsal lithotomy position and speculum inserted into vaginal cavity. Cervix visualized and liberally cleansed with Betadine. A sounding catheter was inserted into the uterine cavity to 7cm. Anterior lip of cervix was grasped with tenaculum. IUD packaging was opened and device placed to 7cm. IUD was inserted into uterine cavity without difficulty. Strings cut to 3cm.     Post-procedure instructions given: YES  Bleeding/infections precautions given: YES     Patient advised to return to office in 1 mo for IUD recheck    Diane De Jesus MD

## 2023-01-05 ENCOUNTER — OFFICE VISIT (OUTPATIENT)
Dept: FAMILY MEDICINE CLINIC | Age: 48
End: 2023-01-05
Payer: COMMERCIAL

## 2023-01-05 VITALS
SYSTOLIC BLOOD PRESSURE: 108 MMHG | OXYGEN SATURATION: 97 % | WEIGHT: 263 LBS | HEIGHT: 66 IN | RESPIRATION RATE: 18 BRPM | HEART RATE: 87 BPM | TEMPERATURE: 97.5 F | DIASTOLIC BLOOD PRESSURE: 74 MMHG | BODY MASS INDEX: 42.27 KG/M2

## 2023-01-05 DIAGNOSIS — G89.29 CHRONIC RIGHT HIP PAIN: ICD-10-CM

## 2023-01-05 DIAGNOSIS — L30.9 DERMATITIS: ICD-10-CM

## 2023-01-05 DIAGNOSIS — M25.551 CHRONIC RIGHT HIP PAIN: ICD-10-CM

## 2023-01-05 DIAGNOSIS — G89.29 CHRONIC BACK PAIN, UNSPECIFIED BACK LOCATION, UNSPECIFIED BACK PAIN LATERALITY: ICD-10-CM

## 2023-01-05 DIAGNOSIS — G47.33 OSA (OBSTRUCTIVE SLEEP APNEA): ICD-10-CM

## 2023-01-05 DIAGNOSIS — M54.9 CHRONIC BACK PAIN, UNSPECIFIED BACK LOCATION, UNSPECIFIED BACK PAIN LATERALITY: ICD-10-CM

## 2023-01-05 DIAGNOSIS — M79.672 LEFT FOOT PAIN: ICD-10-CM

## 2023-01-05 DIAGNOSIS — M79.643 PAIN OF HAND, UNSPECIFIED LATERALITY: ICD-10-CM

## 2023-01-05 DIAGNOSIS — R20.0 NUMBNESS: ICD-10-CM

## 2023-01-05 DIAGNOSIS — E66.01 MORBID OBESITY (HCC): ICD-10-CM

## 2023-01-05 DIAGNOSIS — F39 MOOD DISORDER (HCC): Primary | ICD-10-CM

## 2023-01-05 PROCEDURE — 99214 OFFICE O/P EST MOD 30 MIN: CPT | Performed by: FAMILY MEDICINE

## 2023-01-05 PROCEDURE — 96372 THER/PROPH/DIAG INJ SC/IM: CPT | Performed by: FAMILY MEDICINE

## 2023-01-05 RX ORDER — DEXAMETHASONE SODIUM PHOSPHATE 4 MG/ML
4 INJECTION, SOLUTION INTRA-ARTICULAR; INTRALESIONAL; INTRAMUSCULAR; INTRAVENOUS; SOFT TISSUE ONCE
Status: COMPLETED | OUTPATIENT
Start: 2023-01-05 | End: 2023-01-05

## 2023-01-05 RX ORDER — TIZANIDINE 2 MG/1
2 TABLET ORAL 2 TIMES DAILY PRN
Qty: 60 TABLET | Refills: 0 | Status: SHIPPED | OUTPATIENT
Start: 2023-01-05

## 2023-01-05 RX ORDER — MELOXICAM 7.5 MG/1
7.5 TABLET ORAL DAILY PRN
Qty: 30 TABLET | Refills: 0 | Status: SHIPPED | OUTPATIENT
Start: 2023-01-05

## 2023-01-05 RX ORDER — PAROXETINE HYDROCHLORIDE 20 MG/1
20 TABLET, FILM COATED ORAL EVERY MORNING
Qty: 90 TABLET | Refills: 1 | Status: SHIPPED | OUTPATIENT
Start: 2023-01-05

## 2023-01-05 RX ORDER — CLOTRIMAZOLE AND BETAMETHASONE DIPROPIONATE 10; .64 MG/G; MG/G
CREAM TOPICAL
Qty: 45 G | Refills: 0 | Status: SHIPPED | OUTPATIENT
Start: 2023-01-05

## 2023-01-05 RX ORDER — METHYLPREDNISOLONE 4 MG/1
TABLET ORAL
Qty: 1 KIT | Refills: 0 | Status: SHIPPED | OUTPATIENT
Start: 2023-01-05 | End: 2023-01-11

## 2023-01-05 RX ADMIN — DEXAMETHASONE SODIUM PHOSPHATE 4 MG: 4 INJECTION, SOLUTION INTRA-ARTICULAR; INTRALESIONAL; INTRAMUSCULAR; INTRAVENOUS; SOFT TISSUE at 09:44

## 2023-01-05 ASSESSMENT — PATIENT HEALTH QUESTIONNAIRE - PHQ9
1. LITTLE INTEREST OR PLEASURE IN DOING THINGS: 0
SUM OF ALL RESPONSES TO PHQ QUESTIONS 1-9: 0
2. FEELING DOWN, DEPRESSED OR HOPELESS: 0
SUM OF ALL RESPONSES TO PHQ QUESTIONS 1-9: 0
SUM OF ALL RESPONSES TO PHQ9 QUESTIONS 1 & 2: 0
SUM OF ALL RESPONSES TO PHQ QUESTIONS 1-9: 0
SUM OF ALL RESPONSES TO PHQ QUESTIONS 1-9: 0
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
## 2023-01-11 ASSESSMENT — ENCOUNTER SYMPTOMS
BACK PAIN: 1
ABDOMINAL PAIN: 0
CONSTIPATION: 0
DIARRHEA: 0
VOMITING: 0
SHORTNESS OF BREATH: 0
COUGH: 0
WHEEZING: 0
NAUSEA: 0

## 2023-01-20 ENCOUNTER — EVALUATION (OUTPATIENT)
Dept: PHYSICAL THERAPY | Age: 48
End: 2023-01-20

## 2023-01-20 DIAGNOSIS — M25.551 CHRONIC RIGHT HIP PAIN: Primary | ICD-10-CM

## 2023-01-20 DIAGNOSIS — G89.29 CHRONIC RIGHT HIP PAIN: Primary | ICD-10-CM

## 2023-01-20 DIAGNOSIS — M54.9 CHRONIC BACK PAIN, UNSPECIFIED BACK LOCATION, UNSPECIFIED BACK PAIN LATERALITY: ICD-10-CM

## 2023-01-20 DIAGNOSIS — G89.29 CHRONIC BACK PAIN, UNSPECIFIED BACK LOCATION, UNSPECIFIED BACK PAIN LATERALITY: ICD-10-CM

## 2023-01-20 NOTE — PROGRESS NOTES
[facility]   Phone: [phone]   Fax: [phone]           Date:  2023   Patient: Valdemar Estevez  : 1975  MRN: [id number]  Referring Provider: Tanisha Cramer MD  [address],  [address]     Medical Diagnosis:      Diagnosis Orders   1. Chronic right hip pain        2. Chronic back pain, unspecified back location, unspecified back pain laterality            SUBJECTIVE:     Onset date: 23    Onset: Insidious onset    Mechanism of Injury: Pt reports chronic right hip pain which has been getting progressively worse. Reports h/o getting kicked by a horse in the right hip about 20 years ago. Previous PT: none    Medical Management for Current Problem:  chiropractic treatment    Chief complaint: pain, weakness, poor sleep quality     Behavior: condition is getting worse    Pain: constant  Current: 4-5/10     Best: 2/10     Worst:8/10    Symptom Type/Quality: sharp, aching  Location[de-identified] Back: right lateral side, occasional pain across to left side of low back, pain down lateral hip stopping at mid lateral right thigh         Provoking Activities/Positions: prolonged sitting                 Relieving Activitie/Positions:  sitting with hip bent, heat    Disturbed Sleep: yes  Bladder Dysfunction: no  Bowel Dysfunction: no     Imaging results: No results found. Past Medical History:  No past medical history on file. Past Surgical History:   Procedure Laterality Date    CHOLECYSTECTOMY         Medications:   Current Outpatient Medications   Medication Sig Dispense Refill    PARoxetine (PAXIL) 20 MG tablet Take 1 tablet by mouth every morning 90 tablet 1    meloxicam (MOBIC) 7.5 MG tablet Take 1 tablet by mouth daily as needed for Pain 30 tablet 0    tiZANidine (ZANAFLEX) 2 MG tablet Take 1 tablet by mouth 2 times daily as needed (spasm) 60 tablet 0    clotrimazole-betamethasone (LOTRISONE) 1-0.05 % cream Apply topically 2 times daily. 45 g 0     No current facility-administered medications for this visit. Occupation:  retail . Physical demands include: lifting, carrying, pushing, pulling medium weighted items (20 - 50 lbs Occasionally), standing. Status: full time    Exercise regimen: weight training , treadmill    Hobbies: traveling    Patient Goals: pain relief    Contraindications/Precautions: none    OBJECTIVE:     Observations: well nourished female    Inspection: frontal plane pelvic asymmetry (right side elevated)         Gait: normal    Functional Strength: No deficits noted    Range of Motion:    Trunk: grossly WFL throughout     Lower Extremity:   Right:   [x] Normal   [] Limited;  hip ROM WFL but painful   Left:   [x] Normal   [] Limited       Strength:     Trunk: 4/5   R LE: Hip 4/5, knee 5/5   L LE: 5/5    Palpation: Tender to palpation at right ASIS     Sensation: intact to light touch and temperature. Special Tests:   [] Nerve Root Compression           Right []+ / [] -    Left []+ / [] -  [] Slump           Right []+ / [] -    Left []+ / [] -  [] FADIR          Right []+ / [] -    Left []+ / [] -  [x] S-I Distraction          Right []+ / [x] -    Left []+ / [x] -     [] SLR           Right []+ / [] -    Left []+ / [] -     [] MEIR          Right []+ / [] -    Left []+ / [] -  [x] S-I Compression          Right []+ / [x] -    Left []+ / [x] -   [] Leg Length: []+ / [] -       Special Test Comments: (+) scour test right hip    ASSESSMENT     Outcome Measure:   Modified Oswestry 30% disability    Problems:   Pain reported 2-8/10  ROM decreased   Strength decreased  Decreased functional ability with walking, standing, sitting, lifting, sleeping    Reason for Skilled Care: Pt with worsening right hip pain which is limiting functional tasks and decreasing pt quality of life. [x] There are no barriers affecting plan of care or recovery    [] Barriers to this patient's plan of care or recovery include.     Domestic Concerns: [x] No  [] Yes:    Long Term goals (4 weeks)  Decrease reported pain to 0-3/10  Increase ROM to WFL with minimal pain  Increase Strength to 4+ to 5/5   Oswestry Low Back Disability Questionnaire 15% disability   Independent with Home Exercise Programs    Rehab Potential: [x] Good  [] Fair  [] Poor    PLAN       Treatment Plan:   [x] Therapeutic Exercise  [x] Therapeutic Activity  [x] Neuromuscular Re-education   [] Gait Training  [] Balance Training  [] Aerobic conditioning  [x] Manual Therapy  [] Massage/Fascial release   [] Work/Sport specific activities    [] Pain Neuroscience [x] Cold/hotpack  [] Vasocompression  [x] Electrical Stimulation  [] Lumbar/Cervical Traction  [x] Ultrasound   [] Iontophoresis: 4 mg/mL Dexamethasone Sodium Phosphate 40-80 mAmin  [] Dry Needling      [x] Instruction in HEP      []  Medication allergies reviewed for use of Dexamethasone Sodium Phosphate 4mg/ml  with iontophoresis treatments.  Patient is not allergic.      The following CPT codes are likely to be used in the care of this patient: 22443 PT Evaluation: Low Complexity, 15767 PT Re-Evaluation, 16756 Therapeutic Exercise, 06254 Neuromuscular Re-Education, 05771 Therapeutic Activities, 15312 Manual Therapy, 66403 Electric Stimulation, and 15477 Ultrasound      Suggested Professional Referral: [x] No  [] Yes:     Patient Education:  [x] Plans/Goals, Risks/Benefits discussed  [x] Home exercise program  Method of Education: [x] Verbal  [x] Demo  [x] Written  Comprehension of Education:  [x] Verbalizes understanding.  [x] Demonstrates understanding.  [] Needs Review.  [] Demonstrates/verbalizes understanding of HEP/Ed previously given.    Frequency:  2 days per week for 4 weeks    Patient understands diagnosis/prognosis and consents to treatment, plan and goals: [x] Yes    [] No     Thank you for the opportunity to work with your patient.  If you have questions or comments, please contact me at numbers listed  above.    Electronically signed by: Vahid Lagunas, [address] [facility], [id number]    Medicare Patients Only     Please sign Physician's Certification and return to: Alexy Daniel  [facility]  [address] [facility] [address] [id number]  Dept: [phone]  Dept Fax: [phone]  Loc: [phone] Certification / Comments     Frequency/Duration 2 days per week for 4 weeks. Certification period from 1/20/2023  to 4/19/23. I have reviewed the Plan of Care established for skilled therapy services and certify that the services are required and that they will be provided while the patient is under my care.     Physician's Comments/Revisions:               Physician's Printed Name:                                           [de-identified] Signature:                                                               Date:

## 2023-01-20 NOTE — PROGRESS NOTES
[facility]          Phone: [phone] Fax: [phone]    Physical Therapy Daily Treatment Note  Date:  2023    Patient Name:  Verna Reynolds    :  1975  MRN: [id number]    Evaluating Therapist:  Kiet Chun, JAMIE [id number]    Restrictions/Precautions:    Diagnosis:     Diagnosis Orders   1. Chronic right hip pain        2. Chronic back pain, unspecified back location, unspecified back pain laterality          Treatment Diagnosis:    Insurance/Certification information:  [employer]  Referring Physician:  Rosalba Olson MD  Plan of care signed (Y/N):    Visit# / total visits:    Pain level: 4-5/10   Time In:  0758  Time Out:  0830    Subjective:  See evaluation    Exercises:  Exercise/Equipment Resistance/Repetitions Other comments     Bike/stepper       Hamstring stretch       MEIR stretch       SKTC       Quad stretch       Lower trunk rotation        Hip add       Hip abd       SLR                                                                                Other Therapeutic Activities:  PT evaluation completed. Pt instructed in exercises below for HEP.     Home Exercise Program:  23 - SKTC, MEIR stretch, seated hamstring stretch    Manual Treatments:  N/A    Modalities:  N/A     Time-in Time-out Total Time   72088  Evaluation Low Complexity 0758 0820 22   58394  Evaluation Med Complexity      37064  Evaluation High Complexity      53436  Ther Ex 0820 0830 10   06188  Neuro Re-ed        17313  Ther Activities        07868  Manual Therapy       19770  E-stim       31084  Ultrasound            Session 0758 0830 32       Treatment/Activity Tolerance:  [x] Patient tolerated treatment well [] Patient limited by fatigue  [] Patient limited by pain  [] Patient limited by other medical complications  [] Other:     Prognosis: [x] Good [] Fair  [] Poor    Patient Requires Follow-up: [x] Yes  [] No    Plan:   [] Continue per plan of care [] Alter current plan (see comments)  [x] Plan of care initiated [] Hold pending MD visit [] Discharge  Plan for Next Session:        Electronically signed by:  Odalis Martinez, [id number]

## 2023-01-26 ENCOUNTER — TREATMENT (OUTPATIENT)
Dept: PHYSICAL THERAPY | Age: 48
End: 2023-01-26
Payer: COMMERCIAL

## 2023-01-26 DIAGNOSIS — M54.9 CHRONIC BACK PAIN, UNSPECIFIED BACK LOCATION, UNSPECIFIED BACK PAIN LATERALITY: ICD-10-CM

## 2023-01-26 DIAGNOSIS — G89.29 CHRONIC BACK PAIN, UNSPECIFIED BACK LOCATION, UNSPECIFIED BACK PAIN LATERALITY: ICD-10-CM

## 2023-01-26 DIAGNOSIS — G89.29 CHRONIC RIGHT HIP PAIN: Primary | ICD-10-CM

## 2023-01-26 DIAGNOSIS — M25.551 CHRONIC RIGHT HIP PAIN: Primary | ICD-10-CM

## 2023-01-26 PROCEDURE — 97110 THERAPEUTIC EXERCISES: CPT

## 2023-01-26 NOTE — PROGRESS NOTES
[facility]          Phone: [phone] Fax: [phone]    Physical Therapy Daily Treatment Note  Date:  2023    Patient Name:  Colleen Hoffmann    :  1975  MRN: [id number]    Evaluating Therapist:  Russell Chambers, JAMIE [id number]    Restrictions/Precautions:    Diagnosis:     Diagnosis Orders   1. Chronic right hip pain        2. Chronic back pain, unspecified back location, unspecified back pain laterality          Treatment Diagnosis:    Insurance/Certification information:  [employer]  Referring Physician:  Yvette Rossi MD  Plan of care signed (Y/N):    Visit# / total visits:    Pain level:  5/10   Time In:  0800  Time Out:  08    Subjective:  pt reported compliance with HEP. Gait is noted to be slow and guarded. Exercises:  Exercise/Equipment Resistance/Repetitions Other comments     Bike/X 10 mins      Hamstring stretch 30 sec x 2 reps B       MEIR stretch 30 sec x 2 reps B       SKTC 20 sec x 3 reps B      Quad stretch       Lower trunk rotation  15 sec x 3 reps B       Hip add Ball 5 sec x 10 reps       Hip abd GTB 5 sec x 10 reps       SLR X 10 reps B                 Standing hip abd, ext  X 10 reps each B                                                               Other   pt performed ex slowly and guarded. Pt reported pain after session 7/10. Pt reported she takes muscle relaxer and uses heating pad for pain control. Therapist suggested trying ice x 15-20 mins w/ barrier between ice and skin. Alternating with heat for pain control.   Pt reported understanding    Home Exercise Program:  23 - SKTC, MEIR stretch, seated hamstring stretch    Manual Treatments:  N/A    Modalities:  N/A     Time-in Time-out Total Time   68200  Evaluation Low Complexity      75330  Evaluation Med Complexity      90304  Evaluation High Complexity      78435  Ther Ex 800 838 38   25890  Neuro Re-ed        34274  Ther Activities        28512  Manual Therapy 30057  E-stim       26438  Ultrasound            Session 306 812 36       Treatment/Activity Tolerance:  [x] Patient tolerated treatment well [] Patient limited by fatigue  [] Patient limited by pain  [] Patient limited by other medical complications  [] Other:     Prognosis: [x] Good [] Fair  [] Poor    Patient Requires Follow-up: [x] Yes  [] No    Plan:   [x] Continue per plan of care [] Alter current plan (see comments)  [] Plan of care initiated [] Hold pending MD visit [] Discharge  Plan for Next Session:        Electronically signed by:  Doroteo Guy, PTA [id number]

## 2023-01-27 DIAGNOSIS — M25.551 CHRONIC RIGHT HIP PAIN: ICD-10-CM

## 2023-01-27 DIAGNOSIS — M54.9 CHRONIC BACK PAIN, UNSPECIFIED BACK LOCATION, UNSPECIFIED BACK PAIN LATERALITY: ICD-10-CM

## 2023-01-27 DIAGNOSIS — G89.29 CHRONIC BACK PAIN, UNSPECIFIED BACK LOCATION, UNSPECIFIED BACK PAIN LATERALITY: ICD-10-CM

## 2023-01-27 DIAGNOSIS — G89.29 CHRONIC RIGHT HIP PAIN: ICD-10-CM

## 2023-01-27 RX ORDER — TIZANIDINE 2 MG/1
2 TABLET ORAL 2 TIMES DAILY PRN
Qty: 60 TABLET | Refills: 0 | Status: SHIPPED | OUTPATIENT
Start: 2023-01-27

## 2023-01-27 NOTE — TELEPHONE ENCOUNTER
Last seen 1/5/2023  Next appt Visit date not found    Electronically signed by Darren King on 1/27/23 at 2:27 PM EST

## 2023-01-31 ENCOUNTER — OFFICE VISIT (OUTPATIENT)
Dept: OBGYN | Age: 48
End: 2023-01-31
Payer: COMMERCIAL

## 2023-01-31 VITALS
SYSTOLIC BLOOD PRESSURE: 130 MMHG | HEART RATE: 82 BPM | BODY MASS INDEX: 41.3 KG/M2 | WEIGHT: 257 LBS | HEIGHT: 66 IN | DIASTOLIC BLOOD PRESSURE: 61 MMHG

## 2023-01-31 DIAGNOSIS — N94.10 DYSPAREUNIA IN FEMALE: ICD-10-CM

## 2023-01-31 DIAGNOSIS — Z79.2 ANTIBIOTIC LONG-TERM USE: ICD-10-CM

## 2023-01-31 DIAGNOSIS — R30.0 DYSURIA: ICD-10-CM

## 2023-01-31 DIAGNOSIS — N93.9 ABNORMAL UTERINE BLEEDING: Primary | ICD-10-CM

## 2023-01-31 DIAGNOSIS — Z30.431 IUD CHECK UP: ICD-10-CM

## 2023-01-31 LAB
CLUE CELLS: NORMAL
SOURCE WET PREP: NORMAL
TRICHOMONAS PREP: NORMAL
YEAST WET PREP: NORMAL

## 2023-01-31 PROCEDURE — 99213 OFFICE O/P EST LOW 20 MIN: CPT | Performed by: OBSTETRICS & GYNECOLOGY

## 2023-01-31 RX ORDER — NITROFURANTOIN 25; 75 MG/1; MG/1
100 CAPSULE ORAL 2 TIMES DAILY
Qty: 14 CAPSULE | Refills: 0 | Status: SHIPPED | OUTPATIENT
Start: 2023-01-31 | End: 2023-02-07

## 2023-01-31 RX ORDER — FLUCONAZOLE 150 MG/1
150 TABLET ORAL
Qty: 3 TABLET | Refills: 0 | Status: SHIPPED | OUTPATIENT
Start: 2023-01-31 | End: 2023-02-07

## 2023-01-31 NOTE — PROGRESS NOTES
Apolonia Siddiqui     Patient presents for IUD check. Patient had a Mirena placed 12/19/22. Since then patient has intermittent spotting. She has not had heavy bleeding. Patient has had painful urination and frequent urination. States her urine is cloudy. States this started three weeks ago. Denies vaginal itching but some discharge. She has been having pain with intercourse as well. No past medical history on file. Past Surgical History:   Procedure Laterality Date    CHOLECYSTECTOMY  2007        No family history on file. Social History       Tobacco History       Smoking Status  Every Day Smoking Start Date  1994 Smoking Frequency  1 pack/day for 28.00 years (28.00 pk-yrs) Smoking Tobacco Type  Cigarettes since 1994      Smokeless Tobacco Use  Never              Alcohol History       Alcohol Use Status  Yes Drinks/Week  1-2 Glasses of wine per week Amount  1.0 - 2.0 standard drink of alcohol/wk Comment  weekly              Drug Use       Drug Use Status  Never              Sexual Activity       Sexually Active  Yes Partners  Male                      Current Outpatient Medications:     nitrofurantoin, macrocrystal-monohydrate, (MACROBID) 100 MG capsule, Take 1 capsule by mouth 2 times daily for 7 days, Disp: 14 capsule, Rfl: 0    fluconazole (DIFLUCAN) 150 MG tablet, Take 1 tablet by mouth every 72 hours for 3 doses, Disp: 3 tablet, Rfl: 0    tiZANidine (ZANAFLEX) 2 MG tablet, TAKE 1 TABLET BY MOUTH 2 TIMES DAILY AS NEEDED (SPASM), Disp: 60 tablet, Rfl: 0    PARoxetine (PAXIL) 20 MG tablet, Take 1 tablet by mouth every morning, Disp: 90 tablet, Rfl: 1    meloxicam (MOBIC) 7.5 MG tablet, Take 1 tablet by mouth daily as needed for Pain, Disp: 30 tablet, Rfl: 0    clotrimazole-betamethasone (LOTRISONE) 1-0.05 % cream, Apply topically 2 times daily. , Disp: 45 g, Rfl: 0     No Known Allergies     Vitals:    01/31/23 0857   BP: 130/61   Pulse: 82        Physical Exam:  General: pleasant, alert Breasts: deferred     Pelvic exam: normal external genitalia, vulva, vagina, cervix, uterus and adnexa. Scant discharge, strings visualized from external os. Gael Jimenes was seen today for other. Diagnoses and all orders for this visit:    Abnormal uterine bleeding    IUD check up    Dysuria  -     Culture, Urine; Future  -     nitrofurantoin, macrocrystal-monohydrate, (MACROBID) 100 MG capsule; Take 1 capsule by mouth 2 times daily for 7 days    Dyspareunia in female  -     Wet prep, genital    Antibiotic long-term use  -     fluconazole (DIFLUCAN) 150 MG tablet; Take 1 tablet by mouth every 72 hours for 3 doses        Return in about 1 year (around 1/31/2024) for Annual, or as needed.      Indra Lewis MD

## 2023-01-31 NOTE — PROGRESS NOTES
Patient alert and pleasant with concerns about painful intercourse, burning with uriniation and frequent urination. Here today for IUD string check   Wet prep vaginal and urine culture obtained, labeled and sent to lab   Discharge instructions have been discussed with the patient. Patient advised to call our office with any questions or concerns. Voiced understanding.

## 2023-02-01 DIAGNOSIS — G89.29 CHRONIC RIGHT HIP PAIN: ICD-10-CM

## 2023-02-01 DIAGNOSIS — M54.9 CHRONIC BACK PAIN, UNSPECIFIED BACK LOCATION, UNSPECIFIED BACK PAIN LATERALITY: ICD-10-CM

## 2023-02-01 DIAGNOSIS — M25.551 CHRONIC RIGHT HIP PAIN: ICD-10-CM

## 2023-02-01 DIAGNOSIS — G89.29 CHRONIC BACK PAIN, UNSPECIFIED BACK LOCATION, UNSPECIFIED BACK PAIN LATERALITY: ICD-10-CM

## 2023-02-02 ENCOUNTER — TREATMENT (OUTPATIENT)
Dept: PHYSICAL THERAPY | Age: 48
End: 2023-02-02
Payer: COMMERCIAL

## 2023-02-02 DIAGNOSIS — M25.551 CHRONIC RIGHT HIP PAIN: Primary | ICD-10-CM

## 2023-02-02 DIAGNOSIS — G89.29 CHRONIC RIGHT HIP PAIN: Primary | ICD-10-CM

## 2023-02-02 DIAGNOSIS — M54.9 CHRONIC BACK PAIN, UNSPECIFIED BACK LOCATION, UNSPECIFIED BACK PAIN LATERALITY: ICD-10-CM

## 2023-02-02 DIAGNOSIS — G89.29 CHRONIC BACK PAIN, UNSPECIFIED BACK LOCATION, UNSPECIFIED BACK PAIN LATERALITY: ICD-10-CM

## 2023-02-02 LAB — URINE CULTURE, ROUTINE: NORMAL

## 2023-02-02 PROCEDURE — 97110 THERAPEUTIC EXERCISES: CPT

## 2023-02-02 NOTE — TELEPHONE ENCOUNTER
Last seen 1/5/2023  Next appt Visit date not found    Electronically signed by Ana Garcia on 2/2/23 at 11:18 AM EST

## 2023-02-02 NOTE — PROGRESS NOTES
[facility]          Phone: [phone] Fax: [phone]    Physical Therapy Daily Treatment Note  Date:  2023    Patient Name:  Cary Agosto    :  1975  MRN: [id number]    Evaluating Therapist:  Bridgette Lemus, PT [id number]    Restrictions/Precautions:    Diagnosis:     Diagnosis Orders   1. Chronic right hip pain        2. Chronic back pain, unspecified back location, unspecified back pain laterality          Treatment Diagnosis:    Insurance/Certification information:  [employer]  Referring Physician:  Christiano Buckley MD  Plan of care signed (Y/N):    Visit# / total visits:  3/8  Pain level:  3/10   Time In:  0800  Time Out:  839    Subjective:  pt reported compliance with HEP. Decreased pain reported this date by pt. Exercises:  Exercise/Equipment Resistance/Repetitions Other comments     Bike/X 10 mins      Hamstring stretch 30 sec x 2 reps B       MEIR stretch 30 sec x 2 reps B       SKTC 20 sec x 3 reps B      Quad stretch 30 sec x 2 reps R      Lower trunk rotation  15 sec x 3 reps B       Hip add Ball 5 sec x 10 reps       Hip abd GTB 5 sec x 10 reps       SLR X 10 reps B                 Standing hip abd, ext  X 10 reps each B                                                               Other   pt performed ex slowly and guarded.   Pt reported pain after session 3/10, \" soreness\"    Home Exercise Program:  23 - SKTC, MEIR stretch, seated hamstring stretch    Manual Treatments:  N/A    Modalities:  N/A     Time-in Time-out Total Time   57872  Evaluation Low Complexity      09386  Evaluation Med Complexity      47334  Evaluation High Complexity      71459  Ther Ex 800 839 39   83798  Neuro Re-ed        04281  Ther Activities        99699  Manual Therapy       49482  E-stim       52968  Ultrasound            Session 122 976 79       Treatment/Activity Tolerance:  [x] Patient tolerated treatment well [] Patient limited by fatigue  [] Patient limited by pain  [] Patient limited by other medical complications  [] Other:     Prognosis: [x] Good [] Fair  [] Poor    Patient Requires Follow-up: [x] Yes  [] No    Plan:   [x] Continue per plan of care [] Alter current plan (see comments)  [] Plan of care initiated [] Hold pending MD visit [] Discharge  Plan for Next Session:        Electronically signed by:  Kate Puckett, PTA [id number]

## 2023-02-03 RX ORDER — MELOXICAM 7.5 MG/1
TABLET ORAL
Qty: 30 TABLET | Refills: 0 | Status: SHIPPED | OUTPATIENT
Start: 2023-02-03

## 2023-02-07 ENCOUNTER — TREATMENT (OUTPATIENT)
Dept: PHYSICAL THERAPY | Age: 48
End: 2023-02-07
Payer: COMMERCIAL

## 2023-02-07 DIAGNOSIS — M25.551 CHRONIC RIGHT HIP PAIN: Primary | ICD-10-CM

## 2023-02-07 DIAGNOSIS — G89.29 CHRONIC RIGHT HIP PAIN: Primary | ICD-10-CM

## 2023-02-07 DIAGNOSIS — G89.29 CHRONIC BACK PAIN, UNSPECIFIED BACK LOCATION, UNSPECIFIED BACK PAIN LATERALITY: ICD-10-CM

## 2023-02-07 DIAGNOSIS — M54.9 CHRONIC BACK PAIN, UNSPECIFIED BACK LOCATION, UNSPECIFIED BACK PAIN LATERALITY: ICD-10-CM

## 2023-02-07 PROCEDURE — 97110 THERAPEUTIC EXERCISES: CPT

## 2023-02-07 NOTE — PROGRESS NOTES
[facility]          Phone: [phone] Fax: [phone]    Physical Therapy Daily Treatment Note  Date:  2023    Patient Name:  Oliverio Castellanos    :  1975  MRN: [id number]    Evaluating Therapist:  Taylor Mayen, JAMIE [id number]    Restrictions/Precautions:    Diagnosis:     Diagnosis Orders   1. Chronic right hip pain        2. Chronic back pain, unspecified back location, unspecified back pain laterality          Treatment Diagnosis:    Insurance/Certification information:  [employer]  Referring Physician:  Ramesh Lyles MD  Plan of care signed (Y/N):    Visit# / total visits:    Pain level:  6/10   Time In:  0918  Time Out:  959    Subjective:  pt reported increased pain indicating in R groin, but stating \" It's in the bone\" pt reported at rest pain 6/10 however with movement it \" comes and goes, and is sharp\"    Pt has an EMG scheduled for 23. Exercises:  Exercise/Equipment Resistance/Repetitions Other comments     Bike/X 10 mins      Hamstring stretch 30 sec x 2 reps B       MEIR stretch 30 sec x 2 reps B       SKTC 20 sec x 3 reps B      Quad stretch 30 sec x 2 reps R      Lower trunk rotation  15 sec x 3 reps B       Hip add Ball 5 sec x 10 reps       Hip abd GTB 5 sec x 10 reps       SLR X 10 reps B                 Standing hip abd, ext  X 10 reps each B                                                               Other   pt performed ex slowly and guarded.   Pt reported pain after session 6/10,    Home Exercise Program:  23 - SKTC, MEIR stretch, seated hamstring stretch    Manual Treatments:  N/A    Modalities:  N/A     Time-in Time-out Total Time   17663  Evaluation Low Complexity      23163  Evaluation Med Complexity      46692  Evaluation High Complexity      19425  Ther Ex 918 669 41   90181  Neuro Re-ed        78991  Ther Activities        63701  Manual Therapy       57576  E-stim       48033  Ultrasound            Session 621 839 59 Treatment/Activity Tolerance:  [x] Patient tolerated treatment well [] Patient limited by fatigue  [] Patient limited by pain  [] Patient limited by other medical complications  [] Other:     Prognosis: [x] Good [] Fair  [] Poor    Patient Requires Follow-up: [x] Yes  [] No    Plan:   [x] Continue per plan of care [] Alter current plan (see comments)  [] Plan of care initiated [] Hold pending MD visit [] Discharge  Plan for Next Session:        Electronically signed by:  Stephen Neal, PTA [id number]

## 2023-02-09 ENCOUNTER — TREATMENT (OUTPATIENT)
Dept: PHYSICAL THERAPY | Age: 48
End: 2023-02-09

## 2023-02-09 DIAGNOSIS — G89.29 CHRONIC RIGHT HIP PAIN: Primary | ICD-10-CM

## 2023-02-09 DIAGNOSIS — M54.9 CHRONIC BACK PAIN, UNSPECIFIED BACK LOCATION, UNSPECIFIED BACK PAIN LATERALITY: ICD-10-CM

## 2023-02-09 DIAGNOSIS — G89.29 CHRONIC BACK PAIN, UNSPECIFIED BACK LOCATION, UNSPECIFIED BACK PAIN LATERALITY: ICD-10-CM

## 2023-02-09 DIAGNOSIS — M25.551 CHRONIC RIGHT HIP PAIN: Primary | ICD-10-CM

## 2023-02-09 NOTE — PROGRESS NOTES
[facility]          Phone: [phone] Fax: [phone]    Physical Therapy Daily Treatment Note  Date:  2023    Patient Name:  Buzz Garcia    :  1975  MRN: [id number]    Evaluating Therapist:  Yasmine Goode PT [id number]    Restrictions/Precautions:    Diagnosis:     Diagnosis Orders   1. Chronic right hip pain        2. Chronic back pain, unspecified back location, unspecified back pain laterality          Treatment Diagnosis:    Insurance/Certification information:  [employer]  Referring Physician:  Melissa Carmen MD  Plan of care signed (Y/N):    Visit# / total visits:    Pain level:  3/10   Time In:  0919  Time Out:  959    Subjective:  pt reported decreased pain in R groin, but it intensifies with activity. Pt has an EMG scheduled for 23. Exercises:  Exercise/Equipment Resistance/Repetitions Other comments     Bike/X 10 mins      Hamstring stretch 30 sec x 2 reps B       MEIR stretch 30 sec x 2 reps B       SKTC 20 sec x 3 reps B      Quad stretch 30 sec x 2 reps R      Lower trunk rotation  15 sec x 3 reps B       Hip add Ball 5 sec x 10 reps       Hip abd GTB 5 sec x 10 reps       SLR X 10 reps B                 Standing hip abd, ext  X 10 reps each B                                                               Other   pt continues to perform ex slowly and guarded.   Pt did not rate pain after session    Home Exercise Program:  23 - SKTC, MEIR stretch, seated hamstring stretch    Manual Treatments:  N/A    Modalities:  N/A     Time-in Time-out Total Time   10762  Evaluation Low Complexity      39547  Evaluation Med Complexity      48852  Evaluation High Complexity      51536  Ther Ex 919 959 40   40319  Neuro Re-ed        79358  Ther Activities        68895  Manual Therapy       18844  E-stim       30284  Ultrasound            Session 260 816 03       Treatment/Activity Tolerance:  [x] Patient tolerated treatment well [] Patient limited by fatigue  [] Patient limited by pain  [] Patient limited by other medical complications  [] Other:     Prognosis: [x] Good [] Fair  [] Poor    Patient Requires Follow-up: [x] Yes  [] No    Plan:   [x] Continue per plan of care [] Alter current plan (see comments)  [] Plan of care initiated [] Hold pending MD visit [] Discharge  Plan for Next Session:        Electronically signed by:  Shan Adames, PTA [id number]

## 2023-02-21 DIAGNOSIS — G89.29 CHRONIC RIGHT HIP PAIN: ICD-10-CM

## 2023-02-21 DIAGNOSIS — M25.551 CHRONIC RIGHT HIP PAIN: ICD-10-CM

## 2023-02-21 DIAGNOSIS — G89.29 CHRONIC BACK PAIN, UNSPECIFIED BACK LOCATION, UNSPECIFIED BACK PAIN LATERALITY: ICD-10-CM

## 2023-02-21 DIAGNOSIS — M54.9 CHRONIC BACK PAIN, UNSPECIFIED BACK LOCATION, UNSPECIFIED BACK PAIN LATERALITY: ICD-10-CM

## 2023-02-22 RX ORDER — TIZANIDINE 2 MG/1
2 TABLET ORAL 2 TIMES DAILY PRN
Qty: 60 TABLET | Refills: 0 | Status: SHIPPED | OUTPATIENT
Start: 2023-02-22

## 2023-03-05 DIAGNOSIS — G89.29 CHRONIC BACK PAIN, UNSPECIFIED BACK LOCATION, UNSPECIFIED BACK PAIN LATERALITY: ICD-10-CM

## 2023-03-05 DIAGNOSIS — M54.9 CHRONIC BACK PAIN, UNSPECIFIED BACK LOCATION, UNSPECIFIED BACK PAIN LATERALITY: ICD-10-CM

## 2023-03-05 DIAGNOSIS — M25.551 CHRONIC RIGHT HIP PAIN: ICD-10-CM

## 2023-03-05 DIAGNOSIS — G89.29 CHRONIC RIGHT HIP PAIN: ICD-10-CM

## 2023-03-06 RX ORDER — MELOXICAM 7.5 MG/1
TABLET ORAL
Qty: 30 TABLET | Refills: 0 | Status: SHIPPED | OUTPATIENT
Start: 2023-03-06

## 2023-03-21 DIAGNOSIS — G89.29 CHRONIC RIGHT HIP PAIN: ICD-10-CM

## 2023-03-21 DIAGNOSIS — M54.9 CHRONIC BACK PAIN, UNSPECIFIED BACK LOCATION, UNSPECIFIED BACK PAIN LATERALITY: ICD-10-CM

## 2023-03-21 DIAGNOSIS — G89.29 CHRONIC BACK PAIN, UNSPECIFIED BACK LOCATION, UNSPECIFIED BACK PAIN LATERALITY: ICD-10-CM

## 2023-03-21 DIAGNOSIS — M25.551 CHRONIC RIGHT HIP PAIN: ICD-10-CM

## 2023-03-22 RX ORDER — TIZANIDINE 2 MG/1
2 TABLET ORAL 2 TIMES DAILY PRN
Qty: 60 TABLET | Refills: 0 | Status: SHIPPED
Start: 2023-03-22 | End: 2023-04-18

## 2023-03-27 ENCOUNTER — OFFICE VISIT (OUTPATIENT)
Dept: FAMILY MEDICINE CLINIC | Age: 48
End: 2023-03-27

## 2023-03-27 ENCOUNTER — TELEPHONE (OUTPATIENT)
Dept: FAMILY MEDICINE CLINIC | Age: 48
End: 2023-03-27

## 2023-03-27 VITALS
HEART RATE: 84 BPM | OXYGEN SATURATION: 97 % | DIASTOLIC BLOOD PRESSURE: 68 MMHG | SYSTOLIC BLOOD PRESSURE: 110 MMHG | TEMPERATURE: 97.8 F | BODY MASS INDEX: 41.95 KG/M2 | WEIGHT: 261 LBS | HEIGHT: 66 IN | RESPIRATION RATE: 18 BRPM

## 2023-03-27 DIAGNOSIS — J40 BRONCHITIS: Primary | ICD-10-CM

## 2023-03-27 DIAGNOSIS — J04.0 LARYNGITIS: Primary | ICD-10-CM

## 2023-03-27 RX ORDER — DEXAMETHASONE SODIUM PHOSPHATE 4 MG/ML
4 INJECTION, SOLUTION INTRA-ARTICULAR; INTRALESIONAL; INTRAMUSCULAR; INTRAVENOUS; SOFT TISSUE ONCE
Status: COMPLETED | OUTPATIENT
Start: 2023-03-27 | End: 2023-03-27

## 2023-03-27 RX ORDER — BENZONATATE 200 MG/1
CAPSULE ORAL
COMMUNITY
Start: 2023-03-11

## 2023-03-27 RX ORDER — DEXAMETHASONE SODIUM PHOSPHATE 4 MG/ML
4 INJECTION, SOLUTION INTRA-ARTICULAR; INTRALESIONAL; INTRAMUSCULAR; INTRAVENOUS; SOFT TISSUE ONCE
Qty: 1 ML | Refills: 0
Start: 2023-03-27 | End: 2023-03-27 | Stop reason: ALTCHOICE

## 2023-03-27 RX ORDER — BUDESONIDE AND FORMOTEROL FUMARATE DIHYDRATE 80; 4.5 UG/1; UG/1
2 AEROSOL RESPIRATORY (INHALATION) 2 TIMES DAILY
Qty: 10.2 G | Refills: 0 | Status: SHIPPED | OUTPATIENT
Start: 2023-03-27

## 2023-03-27 RX ORDER — AZITHROMYCIN 250 MG/1
250 TABLET, FILM COATED ORAL SEE ADMIN INSTRUCTIONS
Qty: 6 TABLET | Refills: 0 | Status: SHIPPED | OUTPATIENT
Start: 2023-03-27 | End: 2023-04-01

## 2023-03-27 RX ORDER — GUAIFENESIN AND CODEINE PHOSPHATE 100; 10 MG/5ML; MG/5ML
5 SOLUTION ORAL 2 TIMES DAILY PRN
Qty: 1 EACH | Refills: 0 | Status: SHIPPED | OUTPATIENT
Start: 2023-03-27 | End: 2023-04-01

## 2023-03-27 RX ADMIN — DEXAMETHASONE SODIUM PHOSPHATE 4 MG: 4 INJECTION, SOLUTION INTRA-ARTICULAR; INTRALESIONAL; INTRAMUSCULAR; INTRAVENOUS; SOFT TISSUE at 11:42

## 2023-03-27 SDOH — ECONOMIC STABILITY: INCOME INSECURITY: HOW HARD IS IT FOR YOU TO PAY FOR THE VERY BASICS LIKE FOOD, HOUSING, MEDICAL CARE, AND HEATING?: NOT HARD AT ALL

## 2023-03-27 SDOH — ECONOMIC STABILITY: FOOD INSECURITY: WITHIN THE PAST 12 MONTHS, THE FOOD YOU BOUGHT JUST DIDN'T LAST AND YOU DIDN'T HAVE MONEY TO GET MORE.: NEVER TRUE

## 2023-03-27 SDOH — ECONOMIC STABILITY: FOOD INSECURITY: WITHIN THE PAST 12 MONTHS, YOU WORRIED THAT YOUR FOOD WOULD RUN OUT BEFORE YOU GOT MONEY TO BUY MORE.: NEVER TRUE

## 2023-03-27 SDOH — ECONOMIC STABILITY: HOUSING INSECURITY
IN THE LAST 12 MONTHS, WAS THERE A TIME WHEN YOU DID NOT HAVE A STEADY PLACE TO SLEEP OR SLEPT IN A SHELTER (INCLUDING NOW)?: NO

## 2023-03-28 DIAGNOSIS — J04.0 LARYNGITIS: ICD-10-CM

## 2023-03-28 RX ORDER — GUAIFENESIN/DEXTROMETHORPHAN 100-10MG/5
5 SYRUP ORAL 3 TIMES DAILY PRN
Qty: 120 ML | Refills: 1 | Status: SHIPPED | OUTPATIENT
Start: 2023-03-28 | End: 2023-04-07

## 2023-03-28 NOTE — TELEPHONE ENCOUNTER
Pt came into office and states the Hugo Karime has to be ordered and she would like her medication sent to another pharmacy. Pt also needs a cough syrup. Pt losing her voice and states she is leaving for out of town at 2am. Please advise as this is the 3rd time the patient has inquired about this information for her medications. Would like meds sent to [address].      Electronically signed by To Robles on 3/28/23 at 9:49 AM EDT

## 2023-03-30 PROBLEM — J40 BRONCHITIS: Status: ACTIVE | Noted: 2023-03-30

## 2023-03-30 ASSESSMENT — ENCOUNTER SYMPTOMS
VOMITING: 0
EYE ITCHING: 0
ABDOMINAL DISTENTION: 0
RECTAL PAIN: 0
FACIAL SWELLING: 0
CONSTIPATION: 0
RHINORRHEA: 0
WHEEZING: 0
EYES NEGATIVE: 1
DIARRHEA: 0
EYE DISCHARGE: 0
EYE REDNESS: 0
ABDOMINAL PAIN: 0
PHOTOPHOBIA: 0
SHORTNESS OF BREATH: 0
CHEST TIGHTNESS: 0
NAUSEA: 0
TROUBLE SWALLOWING: 0
SINUS PAIN: 1
CHOKING: 0
EYE PAIN: 0
BACK PAIN: 0
COLOR CHANGE: 0
VOICE CHANGE: 0
SORE THROAT: 0
STRIDOR: 0
ALLERGIC/IMMUNOLOGIC NEGATIVE: 1
SINUS PRESSURE: 1
COUGH: 1
ANAL BLEEDING: 0
BLOOD IN STOOL: 0

## 2023-03-30 NOTE — PROGRESS NOTES
budesonide-formoterol (SYMBICORT) 80-4.5 MCG/ACT AERO Inhale 2 puffs into the lungs 2 times daily 10.2 g 0    tiZANidine (ZANAFLEX) 2 MG tablet TAKE 1 TABLET BY MOUTH 2 TIMES DAILY AS NEEDED (SPASM) 60 tablet 0    meloxicam (MOBIC) 7.5 MG tablet TAKE 1 TABLET BY MOUTH EVERY DAY AS NEEDED FOR PAIN 30 tablet 0    PARoxetine (PAXIL) 20 MG tablet Take 1 tablet by mouth every morning 90 tablet 1    clotrimazole-betamethasone (LOTRISONE) 1-0.05 % cream Apply topically 2 times daily. 45 g 0    [DISCONTINUED] dexamethasone (DECADRON) 4 MG/ML injection Inject 1 mL into the muscle once for 1 dose 1 mL 0    [] dexamethasone (DECADRON) injection 4 mg        No facility-administered encounter medications on file as of 3/27/2023. Return if symptoms worsen or fail to improve.         Reviewed recent labs related to Lyndsey's current problems      Discussed importance of regular Health Maintenance follow up  Health Maintenance   Topic    COVID-19 Vaccine (1)    Pneumococcal 0-64 years Vaccine (1 - PCV)    HIV screen     Hepatitis C screen     DTaP/Tdap/Td vaccine (1 - Tdap)    Flu vaccine (1)    Colorectal Cancer Screen     Depression Screen     Cervical cancer screen     Lipids     Hepatitis A vaccine     Hib vaccine     Meningococcal (ACWY) vaccine

## 2023-04-02 DIAGNOSIS — M25.551 CHRONIC RIGHT HIP PAIN: ICD-10-CM

## 2023-04-02 DIAGNOSIS — G89.29 CHRONIC BACK PAIN, UNSPECIFIED BACK LOCATION, UNSPECIFIED BACK PAIN LATERALITY: ICD-10-CM

## 2023-04-02 DIAGNOSIS — G89.29 CHRONIC RIGHT HIP PAIN: ICD-10-CM

## 2023-04-02 DIAGNOSIS — M54.9 CHRONIC BACK PAIN, UNSPECIFIED BACK LOCATION, UNSPECIFIED BACK PAIN LATERALITY: ICD-10-CM

## 2023-04-04 RX ORDER — MELOXICAM 7.5 MG/1
TABLET ORAL
Qty: 30 TABLET | Refills: 0 | Status: SHIPPED | OUTPATIENT
Start: 2023-04-04

## 2023-04-04 NOTE — TELEPHONE ENCOUNTER
Last seen 3/27/2023  Next appt Visit date not found    Electronically signed by Yusuf Yanez LPN on 9/8/71 at 1:33 PM EDT

## 2023-04-18 DIAGNOSIS — G89.29 CHRONIC BACK PAIN, UNSPECIFIED BACK LOCATION, UNSPECIFIED BACK PAIN LATERALITY: ICD-10-CM

## 2023-04-18 DIAGNOSIS — M25.551 CHRONIC RIGHT HIP PAIN: ICD-10-CM

## 2023-04-18 DIAGNOSIS — M54.9 CHRONIC BACK PAIN, UNSPECIFIED BACK LOCATION, UNSPECIFIED BACK PAIN LATERALITY: ICD-10-CM

## 2023-04-18 DIAGNOSIS — G89.29 CHRONIC RIGHT HIP PAIN: ICD-10-CM

## 2023-04-18 RX ORDER — TIZANIDINE 2 MG/1
2 TABLET ORAL 2 TIMES DAILY PRN
Qty: 60 TABLET | Refills: 0 | Status: SHIPPED | OUTPATIENT
Start: 2023-04-18

## 2023-04-25 ENCOUNTER — OFFICE VISIT (OUTPATIENT)
Dept: FAMILY MEDICINE CLINIC | Age: 48
End: 2023-04-25
Payer: COMMERCIAL

## 2023-04-25 VITALS
HEIGHT: 66 IN | OXYGEN SATURATION: 98 % | SYSTOLIC BLOOD PRESSURE: 126 MMHG | DIASTOLIC BLOOD PRESSURE: 72 MMHG | TEMPERATURE: 98.1 F | WEIGHT: 262.8 LBS | RESPIRATION RATE: 18 BRPM | BODY MASS INDEX: 42.23 KG/M2 | HEART RATE: 68 BPM

## 2023-04-25 DIAGNOSIS — J30.2 SEASONAL ALLERGIES: ICD-10-CM

## 2023-04-25 DIAGNOSIS — R05.8 DRY COUGH: Primary | ICD-10-CM

## 2023-04-25 DIAGNOSIS — F17.208 NICOTINE DEPENDENCE WITH OTHER NICOTINE-INDUCED DISORDER, UNSPECIFIED NICOTINE PRODUCT TYPE: ICD-10-CM

## 2023-04-25 PROCEDURE — G8417 CALC BMI ABV UP PARAM F/U: HCPCS | Performed by: FAMILY MEDICINE

## 2023-04-25 PROCEDURE — 4004F PT TOBACCO SCREEN RCVD TLK: CPT | Performed by: FAMILY MEDICINE

## 2023-04-25 PROCEDURE — G8427 DOCREV CUR MEDS BY ELIG CLIN: HCPCS | Performed by: FAMILY MEDICINE

## 2023-04-25 PROCEDURE — 99214 OFFICE O/P EST MOD 30 MIN: CPT | Performed by: FAMILY MEDICINE

## 2023-04-25 RX ORDER — OLOPATADINE HYDROCHLORIDE 1 MG/ML
1 SOLUTION/ DROPS OPHTHALMIC 2 TIMES DAILY
Qty: 5 ML | Refills: 0 | Status: SHIPPED | OUTPATIENT
Start: 2023-04-25 | End: 2023-05-25

## 2023-04-25 RX ORDER — ALBUTEROL SULFATE 90 UG/1
2 AEROSOL, METERED RESPIRATORY (INHALATION)
Qty: 54 G | Refills: 0 | Status: SHIPPED | OUTPATIENT
Start: 2023-04-25 | End: 2023-04-30

## 2023-04-25 RX ORDER — FEXOFENADINE HCL 180 MG/1
180 TABLET ORAL DAILY
Qty: 30 TABLET | Refills: 1 | Status: SHIPPED | OUTPATIENT
Start: 2023-04-25 | End: 2023-05-25

## 2023-04-25 RX ORDER — AZELASTINE 1 MG/ML
1 SPRAY, METERED NASAL 2 TIMES DAILY
Qty: 60 ML | Refills: 1 | Status: SHIPPED | OUTPATIENT
Start: 2023-04-25

## 2023-04-25 NOTE — PROGRESS NOTES
[facility])  Family Medicine Outpatient        SUBJECTIVE:  CC: had concerns including Cough (She has had the cough for the last 3 months. She would like to talk about seeing a specialist ). HPI:  Surendra Rausch is a female 41 y.o. presented to the clinic for a dry cough. She stopped smoking in hopes to help her symptoms. Jami Rojas went to [facility] twice. The steroid didn't help her symptoms. Nor did Tessalon Perles, Zyrtec, Mucinex, or Flonase. Her symptoms are worst when she lays down at night. Hasn't been using her allergy medication consistently. Has drainage in the back of her throat. Review of Systems   Constitutional:  Negative for appetite change, fatigue and fever. HENT:  Positive for postnasal drip. Negative for sore throat. Eyes:  Positive for itching. Negative for visual disturbance. Respiratory:  Positive for cough. Negative for shortness of breath and wheezing. Cardiovascular:  Negative for chest pain and palpitations. Gastrointestinal:  Negative for abdominal pain, constipation, diarrhea, nausea and vomiting.      Outpatient Medications Marked as Taking for the 4/25/23 encounter (Office Visit) with Lynn De La Paz MD   Medication Sig Dispense Refill    fexofenadine (ALLEGRA) 180 MG tablet Take 1 tablet by mouth daily 30 tablet 1    azelastine (ASTELIN) 0.1 % nasal spray 1 spray by Nasal route 2 times daily Use in each nostril as directed 60 mL 1    albuterol sulfate HFA (VENTOLIN HFA) 108 (90 Base) MCG/ACT inhaler Inhale 2 puffs into the lungs every 6-8 hours as needed for Shortness of Breath (cough trial) 54 g 0    olopatadine (PATANOL) 0.1 % ophthalmic solution Place 1 drop into both eyes 2 times daily 5 mL 0    tiZANidine (ZANAFLEX) 2 MG tablet TAKE 1 TABLET BY MOUTH 2 TIMES DAILY AS NEEDED (SPASM) 60 tablet 0    meloxicam (MOBIC) 7.5 MG tablet TAKE 1 TABLET BY MOUTH EVERY DAY AS NEEDED FOR PAIN 30 tablet 0    mometasone-formoterol (DULERA) 100-5 MCG/ACT inhaler Inhale 1 puff

## 2023-04-28 ASSESSMENT — ENCOUNTER SYMPTOMS
ABDOMINAL PAIN: 0
SORE THROAT: 0
WHEEZING: 0
NAUSEA: 0
SHORTNESS OF BREATH: 0
COUGH: 1
EYE ITCHING: 1
DIARRHEA: 0
VOMITING: 0
CONSTIPATION: 0

## 2023-04-30 ENCOUNTER — HOSPITAL ENCOUNTER (EMERGENCY)
Age: 48
Discharge: HOME | End: 2023-04-30
Payer: COMMERCIAL

## 2023-04-30 ENCOUNTER — APPOINTMENT (OUTPATIENT)
Dept: GENERAL RADIOLOGY | Age: 48
End: 2023-04-30
Payer: COMMERCIAL

## 2023-04-30 VITALS
TEMPERATURE: 97.9 F | WEIGHT: 262 LBS | HEART RATE: 87 BPM | BODY MASS INDEX: 42.11 KG/M2 | SYSTOLIC BLOOD PRESSURE: 130 MMHG | RESPIRATION RATE: 18 BRPM | DIASTOLIC BLOOD PRESSURE: 69 MMHG | OXYGEN SATURATION: 100 % | HEIGHT: 66 IN

## 2023-04-30 DIAGNOSIS — R05.9 COUGH, UNSPECIFIED TYPE: Primary | ICD-10-CM

## 2023-04-30 DIAGNOSIS — R68.89 ITCHY EYES: ICD-10-CM

## 2023-04-30 PROCEDURE — 71046 X-RAY EXAM CHEST 2 VIEWS: CPT

## 2023-04-30 PROCEDURE — 99211 OFF/OP EST MAY X REQ PHY/QHP: CPT

## 2023-04-30 RX ORDER — DEXTROMETHORPHAN HYDROBROMIDE AND PROMETHAZINE HYDROCHLORIDE 15; 6.25 MG/5ML; MG/5ML
5 SYRUP ORAL 4 TIMES DAILY PRN
Qty: 120 ML | Refills: 0 | Status: SHIPPED | OUTPATIENT
Start: 2023-04-30

## 2023-04-30 ASSESSMENT — PAIN SCALES - GENERAL: PAINLEVEL_OUTOF10: 0

## 2023-04-30 ASSESSMENT — VISUAL ACUITY: OU: 1

## 2023-04-30 ASSESSMENT — PAIN - FUNCTIONAL ASSESSMENT: PAIN_FUNCTIONAL_ASSESSMENT: 0-10

## 2023-05-06 DIAGNOSIS — G89.29 CHRONIC BACK PAIN, UNSPECIFIED BACK LOCATION, UNSPECIFIED BACK PAIN LATERALITY: ICD-10-CM

## 2023-05-06 DIAGNOSIS — M25.551 CHRONIC RIGHT HIP PAIN: ICD-10-CM

## 2023-05-06 DIAGNOSIS — G89.29 CHRONIC RIGHT HIP PAIN: ICD-10-CM

## 2023-05-06 DIAGNOSIS — M54.9 CHRONIC BACK PAIN, UNSPECIFIED BACK LOCATION, UNSPECIFIED BACK PAIN LATERALITY: ICD-10-CM

## 2023-05-08 RX ORDER — MELOXICAM 7.5 MG/1
TABLET ORAL
Qty: 30 TABLET | Refills: 0 | OUTPATIENT
Start: 2023-05-08

## 2023-05-13 DIAGNOSIS — M25.551 CHRONIC RIGHT HIP PAIN: ICD-10-CM

## 2023-05-13 DIAGNOSIS — G89.29 CHRONIC BACK PAIN, UNSPECIFIED BACK LOCATION, UNSPECIFIED BACK PAIN LATERALITY: ICD-10-CM

## 2023-05-13 DIAGNOSIS — M54.9 CHRONIC BACK PAIN, UNSPECIFIED BACK LOCATION, UNSPECIFIED BACK PAIN LATERALITY: ICD-10-CM

## 2023-05-13 DIAGNOSIS — G89.29 CHRONIC RIGHT HIP PAIN: ICD-10-CM

## 2023-05-15 RX ORDER — TIZANIDINE 2 MG/1
2 TABLET ORAL 2 TIMES DAILY PRN
Qty: 60 TABLET | Refills: 0 | Status: SHIPPED | OUTPATIENT
Start: 2023-05-15

## 2023-05-15 NOTE — TELEPHONE ENCOUNTER
Last seen 4/25/2023  Next appt 5/25/2023    Electronically signed by Susana Demarco LPN on 3/64/42 at 81:19 AM EDT

## 2023-05-17 DIAGNOSIS — R05.8 DRY COUGH: ICD-10-CM

## 2023-05-17 DIAGNOSIS — J30.2 SEASONAL ALLERGIES: ICD-10-CM

## 2023-05-17 RX ORDER — ALBUTEROL SULFATE 90 UG/1
2 AEROSOL, METERED RESPIRATORY (INHALATION)
Qty: 18 EACH | Refills: 0 | Status: SHIPPED | OUTPATIENT
Start: 2023-05-17 | End: 2023-05-22

## 2023-05-17 NOTE — TELEPHONE ENCOUNTER
Last Appointment:  4/25/2023  Future Appointments   Date Time Provider Rashel Mullins   5/25/2023  7:45 AM Cheyenne Mcdowell MD [facility]    Electronically signed by Haider Parikh LPN on 1/63/03 at 0:18 AM EDT

## 2023-05-17 NOTE — TELEPHONE ENCOUNTER
Last Appointment:  4/25/2023  Future Appointments   Date Time Provider Rashel Mullins   5/25/2023  7:45 AM Priyank Martinez MD [facility]    Electronically signed by Guillaume Choi LPN on 5/89/89 at 75:46 AM EDT

## 2023-05-18 RX ORDER — FEXOFENADINE HCL 180 MG/1
TABLET ORAL
Qty: 30 TABLET | Refills: 1 | Status: SHIPPED | OUTPATIENT
Start: 2023-05-18

## 2023-07-04 DIAGNOSIS — R23.2 FLUSHING: ICD-10-CM

## 2023-07-05 RX ORDER — PAROXETINE HYDROCHLORIDE 20 MG/1
TABLET, FILM COATED ORAL
Qty: 90 TABLET | Refills: 1 | Status: SHIPPED | OUTPATIENT
Start: 2023-07-05